# Patient Record
Sex: FEMALE | Race: ASIAN | Employment: OTHER | URBAN - METROPOLITAN AREA
[De-identification: names, ages, dates, MRNs, and addresses within clinical notes are randomized per-mention and may not be internally consistent; named-entity substitution may affect disease eponyms.]

---

## 2017-02-02 RX ORDER — METFORMIN HYDROCHLORIDE 500 MG/1
500 TABLET, EXTENDED RELEASE ORAL
Qty: 90 TAB | Refills: 0 | Status: SHIPPED | OUTPATIENT
Start: 2017-02-02 | End: 2017-04-28 | Stop reason: SDUPTHER

## 2017-04-28 ENCOUNTER — OFFICE VISIT (OUTPATIENT)
Dept: FAMILY MEDICINE CLINIC | Age: 62
End: 2017-04-28

## 2017-04-28 VITALS
OXYGEN SATURATION: 98 % | HEART RATE: 82 BPM | SYSTOLIC BLOOD PRESSURE: 164 MMHG | TEMPERATURE: 98.6 F | WEIGHT: 168.6 LBS | DIASTOLIC BLOOD PRESSURE: 104 MMHG | HEIGHT: 63 IN | BODY MASS INDEX: 29.88 KG/M2

## 2017-04-28 DIAGNOSIS — E55.9 VITAMIN D DEFICIENCY: ICD-10-CM

## 2017-04-28 DIAGNOSIS — E11.9 TYPE 2 DIABETES MELLITUS WITHOUT COMPLICATION, WITHOUT LONG-TERM CURRENT USE OF INSULIN (HCC): Primary | ICD-10-CM

## 2017-04-28 DIAGNOSIS — E78.00 PURE HYPERCHOLESTEROLEMIA: ICD-10-CM

## 2017-04-28 DIAGNOSIS — I10 ESSENTIAL HYPERTENSION: ICD-10-CM

## 2017-04-28 DIAGNOSIS — Z23 ENCOUNTER FOR IMMUNIZATION: ICD-10-CM

## 2017-04-28 LAB
A-G RATIO,AGRAT: 1.6 RATIO (ref 1.1–2.6)
ALBUMIN SERPL-MCNC: 4.5 G/DL (ref 3.5–5)
ALP SERPL-CCNC: 115 U/L (ref 40–120)
ALT SERPL-CCNC: 18 U/L (ref 5–40)
ANION GAP SERPL CALC-SCNC: 19 MMOL/L
AST SERPL W P-5'-P-CCNC: 18 U/L (ref 10–37)
BILIRUB SERPL-MCNC: 0.5 MG/DL (ref 0.2–1.2)
BUN SERPL-MCNC: 15 MG/DL (ref 6–22)
CALCIUM SERPL-MCNC: 9.5 MG/DL (ref 8.4–10.5)
CHLORIDE SERPL-SCNC: 100 MMOL/L (ref 98–110)
CHOLEST SERPL-MCNC: 174 MG/DL (ref 110–200)
CO2 SERPL-SCNC: 25 MMOL/L (ref 20–32)
CREAT SERPL-MCNC: 0.8 MG/DL (ref 0.8–1.4)
CREATININE, URINE: 258 MG/DL
ERYTHROCYTE [DISTWIDTH] IN BLOOD BY AUTOMATED COUNT: 14.3 % (ref 10–16)
GFRAA, 66117: >60
GFRNA, 66118: >60
GLOBULIN,GLOB: 2.9 G/DL (ref 2–4)
GLUCOSE SERPL-MCNC: 133 MG/DL (ref 65–99)
HBA1C MFR BLD HPLC: 6.8 %
HCT VFR BLD AUTO: 45.7 % (ref 35.1–48)
HDLC SERPL-MCNC: 53 MG/DL (ref 40–59)
HGB BLD-MCNC: 14.4 G/DL (ref 11.7–16)
LDLC SERPL CALC-MCNC: 103 MG/DL (ref 50–99)
MCH RBC QN AUTO: 28 PG (ref 26–34)
MCHC RBC AUTO-ENTMCNC: 32 G/DL (ref 32–36)
MCV RBC AUTO: 89 FL (ref 80–95)
MICROALB/CREAT RATIO, 140286: 5.9 MCG/MG OF CREATININE (ref 0–30)
MICROALBUMIN,URINE RANDOM 140054: 15.2 UG/ML (ref 0.1–17)
PLATELET # BLD AUTO: 282 K/UL (ref 140–440)
PMV BLD AUTO: 12.3 FL (ref 6–10.8)
POTASSIUM SERPL-SCNC: 4.4 MMOL/L (ref 3.5–5.5)
PROT SERPL-MCNC: 7.4 G/DL (ref 6.2–8.1)
RBC # BLD AUTO: 5.13 M/UL (ref 3.8–5.2)
SODIUM SERPL-SCNC: 144 MMOL/L (ref 133–145)
TRIGL SERPL-MCNC: 92 MG/DL (ref 40–149)
VLDLC SERPL CALC-MCNC: 18 MG/DL (ref 8–30)
WBC # BLD AUTO: 8.2 K/UL (ref 4–11)

## 2017-04-28 RX ORDER — ERGOCALCIFEROL 1.25 MG/1
50000 CAPSULE ORAL
Qty: 12 CAP | Refills: 3 | Status: SHIPPED | OUTPATIENT
Start: 2017-04-28 | End: 2018-12-03 | Stop reason: ALTCHOICE

## 2017-04-28 RX ORDER — TELMISARTAN AND HYDROCHLORTHIAZIDE 80; 25 MG/1; MG/1
1 TABLET ORAL DAILY
Qty: 90 TAB | Refills: 1 | Status: SHIPPED | OUTPATIENT
Start: 2017-04-28 | End: 2018-06-04 | Stop reason: SDUPTHER

## 2017-04-28 RX ORDER — METFORMIN HYDROCHLORIDE 500 MG/1
500 TABLET, EXTENDED RELEASE ORAL
Qty: 90 TAB | Refills: 1 | Status: SHIPPED | OUTPATIENT
Start: 2017-04-28 | End: 2017-12-04 | Stop reason: SDUPTHER

## 2017-04-28 RX ORDER — ATORVASTATIN CALCIUM 10 MG/1
10 TABLET, FILM COATED ORAL DAILY
Qty: 90 TAB | Refills: 1 | Status: SHIPPED | OUTPATIENT
Start: 2017-04-28 | End: 2018-06-04 | Stop reason: SDUPTHER

## 2017-04-28 NOTE — PATIENT INSTRUCTIONS

## 2017-04-28 NOTE — MR AVS SNAPSHOT
Visit Information Date & Time Provider Department Dept. Phone Encounter #  
 4/28/2017  8:15 AM Venice Miller, [facility] [phone] [id number] Follow-up Instructions Return in about 4 months (around 8/28/2017). Upcoming Health Maintenance Date Due MICROALBUMIN Q1 4/11/2017 EYE EXAM RETINAL OR DILATED Q1 6/10/2017 BREAST CANCER SCRN MAMMOGRAM 9/1/2017 LIPID PANEL Q1 9/26/2017 HEMOGLOBIN A1C Q6M 10/28/2017 FOOT EXAM Q1 4/28/2018 PAP AKA CERVICAL CYTOLOGY 4/11/2019 COLONOSCOPY 5/22/2024 DTaP/Tdap/Td series (2 - Td) 9/22/2025 Allergies as of 4/28/2017  Review Complete On: 9/26/2016 By: Venice Miller MD  
 No Known Allergies Current Immunizations  Reviewed on 9/22/2015 Name Date Pneumococcal Polysaccharide (PPSV-23)  Incomplete Tdap 9/22/2015  8:04 AM  
  
 Not reviewed this visit You Were Diagnosed With   
  
 Codes Comments Type 2 diabetes mellitus without complication, without long-term current use of insulin (HCC)    -  Primary ICD-10-CM: E11.9 ICD-9-CM: 250.00 Pure hypercholesterolemia     ICD-10-CM: E78.00 ICD-9-CM: 272.0 Essential hypertension     ICD-10-CM: I10 
ICD-9-CM: 401.9 Vitamin D deficiency     ICD-10-CM: E55.9 ICD-9-CM: 268.9 Encounter for immunization     ICD-10-CM: F04 ICD-9-CM: V03.89 Vitals BP Pulse Temp Height(growth percentile) Weight(growth percentile) SpO2  
 (!) 164/104 (BP 1 Location: Left arm, BP Patient Position: Sitting) 82 98.6 °F (37 °C) (Oral) 5' 3\" (1.6 m) 168 lb 9.6 oz (76.5 kg) 98% BMI OB Status Smoking Status 29.87 kg/m2 Menopause Never Smoker BMI and BSA Data Body Mass Index Body Surface Area  
 29.87 kg/m 2 1.84 m 2 Preferred Pharmacy Pharmacy Name Phone [id number] [address] [phone] Your Updated Medication List  
  
   
 This list is accurate as of: 4/28/17  8:36 AM.  Always use your most recent med list.  
  
  
  
  
 aspirin delayed-release 81 mg tablet Take 1 Tab by mouth daily. atorvastatin 10 mg tablet Commonly known as:  LIPITOR Take 1 Tab by mouth daily. Blood-Glucose Meter monitoring kit Test glucose daily  
  
 ergocalciferol 50,000 unit capsule Commonly known as:  VITAMIN D2 Take 1 Cap by mouth every seven (7) days. glucose blood VI test strips strip Commonly known as:  blood glucose test  
Test glucose daily Lancets Misc Test glucose daily  
  
 metFORMIN  mg tablet Commonly known as:  GLUCOPHAGE XR Take 1 Tab by mouth daily (with dinner). omeprazole 20 mg capsule Commonly known as:  PriLOSEC Take 1 Cap by mouth daily. SITagliptin 100 mg tablet Commonly known as:  Shannon Go Take 1 Tab by mouth daily. telmisartan-hydroCHLOROthiazide 80-25 mg per tablet Commonly known as:  MICARDIS HCT Take 1 Tab by mouth daily. Prescriptions Sent to Pharmacy Refills SITagliptin (JANUVIA) 100 mg tablet 0 Sig: Take 1 Tab by mouth daily. Class: Normal  
 [facility]: [facility] [address] [place] [address] Ph #: [phone] Route: Oral  
 metFORMIN ER (GLUCOPHAGE XR) 500 mg tablet 1 Sig: Take 1 Tab by mouth daily (with dinner). Class: Normal  
 [facility]: [facility] [address] [place] [address] Ph #: [phone] Route: Oral  
 ergocalciferol (VITAMIN D2) 50,000 unit capsule 3 Sig: Take 1 Cap by mouth every seven (7) days. Class: Normal  
 [facility]: [facility] [address] [place] [address] Ph #: [phone] Route: Oral  
 atorvastatin (LIPITOR) 10 mg tablet 1 Sig: Take 1 Tab by mouth daily.   
 Class: Normal  
 Pharmacy: [address] [place] [address] [address] [facility] Ph #: [phone] Route: Oral  
 telmisartan-hydroCHLOROthiazide (MICARDIS HCT) 80-25 mg per tablet 1 Sig: Take 1 Tab by mouth daily. Class: Normal  
 Pharmacy: [facility] Ph #: [phone] Route: Oral  
  
We Performed the Following AMB POC HEMOGLOBIN A1C [63651 CPT(R)] HM DIABETES FOOT EXAM [HM7 Custom] PNEUMOCOCCAL POLYSACCHARIDE VACCINE, 23-VALENT, ADULT OR IMMUNOSUPPRESSED PT DOSE, [31746 CPT(R)] [place] IMMUNIZ ADMIN,1 SINGLE/COMB VAC/TOXOID [id number] CPT(R)] Follow-up Instructions Return in about 4 months (around 8/28/2017). To-Do List   
 04/28/2017 Lab:  CBC W/O DIFF   
  
 04/28/2017 Lab:  LIPID PANEL   
  
 04/28/2017 Lab:  METABOLIC PANEL, COMPREHENSIVE   
  
 04/28/2017 Lab:  MICROALBUMIN, UR, RAND W/ MICROALBUMIN/CREA RATIO   
  
 06/01/2017 Outpatient Referral:  REFERRAL TO OPHTHALMOLOGY Referral Information Referral ID Referred By Referred To  
  
 [id number] Basilia Sarmiento V Not Available Visits Status Start Date End Date 1 New Request 4/28/17 4/28/18 If your referral has a status of pending review or denied, additional information will be sent to support the outcome of this decision. Patient Instructions Learning About Diabetes Food Guidelines Your Care Instructions Meal planning is important to manage diabetes. It helps keep your blood sugar at a target level (which you set with your doctor). You don't have to eat special foods. You can eat what your family eats, including sweets once in a while. But you do have to pay attention to how often you eat and how much you eat of certain foods. You may want to work with a dietitian or a certified diabetes educator (CDE) to help you plan meals and snacks. A dietitian or CDE can also help you lose weight if that is one of your goals. What should you know about eating carbs? Managing the amount of carbohydrate (carbs) you eat is an important part of healthy meals when you have diabetes. Carbohydrate is found in many foods. · Learn which foods have carbs. And learn the amounts of carbs in different foods. ¨ Bread, cereal, pasta, and rice have about 15 grams of carbs in a serving. A serving is 1 slice of bread (1 ounce), ½ cup of cooked cereal, or 1/3 cup of cooked pasta or rice. ¨ Fruits have 15 grams of carbs in a serving. A serving is 1 small fresh fruit, such as an apple or orange; ½ of a banana; ½ cup of cooked or canned fruit; ½ cup of fruit juice; 1 cup of melon or raspberries; or 2 tablespoons of dried fruit. ¨ Milk and no-sugar-added yogurt have 15 grams of carbs in a serving. A serving is 1 cup of milk or 2/3 cup of no-sugar-added yogurt. ¨ Starchy vegetables have 15 grams of carbs in a serving. A serving is ½ cup of mashed potatoes or sweet potato; 1 cup winter squash; ½ of a small baked potato; ½ cup of cooked beans; or ½ cup cooked corn or green peas. · Learn how much carbs to eat each day and at each meal. A dietitian or CDE can teach you how to keep track of the amount of carbs you eat. This is called carbohydrate counting. · If you are not sure how to count carbohydrate grams, use the Plate Method to plan meals. It is a good, quick way to make sure that you have a balanced meal. It also helps you spread carbs throughout the day. ¨ Divide your plate by types of foods. Put non-starchy vegetables on half the plate, meat or other protein food on one-quarter of the plate, and a grain or starchy vegetable in the final quarter of the plate.  To this you can add a small piece of fruit and 1 cup of milk or yogurt, depending on how many carbs you are supposed to eat at a meal. 
· Try to eat about the same amount of carbs at each meal. Do not \"save up\" your daily allowance of carbs to eat at one meal. 
 · Proteins have very little or no carbs per serving. Examples of proteins are beef, chicken, turkey, fish, eggs, tofu, cheese, cottage cheese, and peanut butter. A serving size of meat is 3 ounces, which is about the size of a deck of cards. Examples of meat substitute serving sizes (equal to 1 ounce of meat) are 1/4 cup of cottage cheese, 1 egg, 1 tablespoon of peanut butter, and ½ cup of tofu. How can you eat out and still eat healthy? · Learn to estimate the serving sizes of foods that have carbohydrate. If you measure food at home, it will be easier to estimate the amount in a serving of restaurant food. · If the meal you order has too much carbohydrate (such as potatoes, corn, or baked beans), ask to have a low-carbohydrate food instead. Ask for a salad or green vegetables. · If you use insulin, check your blood sugar before and after eating out to help you plan how much to eat in the future. · If you eat more carbohydrate at a meal than you had planned, take a walk or do other exercise. This will help lower your blood sugar. What else should you know? · Limit saturated fat, such as the fat from meat and dairy products. This is a healthy choice because people who have diabetes are at higher risk of heart disease. So choose lean cuts of meat and nonfat or low-fat dairy products. Use olive or canola oil instead of butter or shortening when cooking. · Don't skip meals. Your blood sugar may drop too low if you skip meals and take insulin or certain medicines for diabetes. · Check with your doctor before you drink alcohol. Alcohol can cause your blood sugar to drop too low. Alcohol can also cause a bad reaction if you take certain diabetes medicines. Follow-up care is a key part of your treatment and safety. Be sure to make and go to all appointments, and call your doctor if you are having problems. It's also a good idea to know your test results and keep a list of the medicines you take. Where can you learn more? Go to http://subha-elmer.info/. Enter N822 in the search box to learn more about \"Learning About Diabetes Food Guidelines. \" Current as of: May 23, 2016 Content Version: 11.2 © 3794-1273 [employer]. Care instructions adapted under license by [employer] (which disclaims liability or warranty for this information). If you have questions about a medical condition or this instruction, always ask your healthcare professional. [address] any warranty or liability for your use of this information. Introducing [facility]! Juan J Pierson introduces [employer] patient portal. Now you can access parts of your medical record, email your doctor's office, and request medication refills online. 1. In your internet browser, go to [email or web address] 2. Click on the First Time User? Click Here link in the Sign In box. You will see the New Member Sign Up page. 3. Enter your Transcriptic Access Code exactly as it appears below. You will not need to use this code after youve completed the sign-up process. If you do not sign up before the expiration date, you must request a new code. · Transcript[employer] Access Code: [email or web address] Expires: 7/27/2017  8:33 AM 
 
4. Enter the last four digits of your Social Security Number (xxxx) and Date of Birth (mm/dd/yyyy) as indicated and click Submit. You will be taken to the next sign-up page. 5. Create a Transcriptic ID. This will be your Transcriptic login ID and cannot be changed, so think of one that is secure and easy to remember. 6. Create a [employer] password. You can change your password at any time. 7. Enter your Password Reset Question and Answer. This can be used at a later time if you forget your password. 8. Enter your e-mail address. You will receive e-mail notification when new information is available in [address] 9. Click Sign Up. You can now view and download portions of your medical record. 10. Click the Download Summary menu link to download a portable copy of your medical information. If you have questions, please visit the Frequently Asked Questions section of the [employer] website. Remember, [employer] is NOT to be used for urgent needs. For medical emergencies, dial 911. Now available from your iPhone and Android! Please provide this summary of care documentation to your next provider. Your primary care clinician is listed as Lilibeth 13. If you have any questions after today's visit, please call [phone].

## 2017-04-28 NOTE — PROGRESS NOTES
Dom Moon is a 59 y.o. female  Chief Complaint   Patient presents with    Diabetes     3 months follow up     1. Have you been to the ER, urgent care clinic since your last visit? Hospitalized since your last visit? No    2. Have you seen or consulted any other health care providers outside of the [address] [place] [address] since your last visit? Include any pap smears or colon screening.  No    Pt is fasting today and didn't take any of her meds

## 2017-04-28 NOTE — PROGRESS NOTES
Assessment/Plan:    1. Type 2 diabetes mellitus without complication, without long-term current use of insulin (HCC)  -A1c 6.8. Cont current regimen. - AMB POC HEMOGLOBIN A1C  - MICROALBUMIN, UR, RAND W/ MICROALBUMIN/CREA RATIO; Future  - SITagliptin (JANUVIA) 100 mg tablet; Take 1 Tab by mouth daily. Dispense: 90 Tab; Refill: 0  - metFORMIN ER (GLUCOPHAGE XR) 500 mg tablet; Take 1 Tab by mouth daily (with dinner). Dispense: 90 Tab; Refill: 1  -  DIABETES FOOT EXAM  - REFERRAL TO OPHTHALMOLOGY; Future    2. Pure hypercholesterolemia  -cont statin  - METABOLIC PANEL, COMPREHENSIVE; Future  - LIPID PANEL; Future  - atorvastatin (LIPITOR) 10 mg tablet; Take 1 Tab by mouth daily. Dispense: 90 Tab; Refill: 1    3. Essential hypertension  -come in 1 week for bp ck as she didn't take her meds today  - METABOLIC PANEL, COMPREHENSIVE; Future  - LIPID PANEL; Future  - CBC W/O DIFF; Future  - telmisartan-hydroCHLOROthiazide (MICARDIS HCT) 80-25 mg per tablet; Take 1 Tab by mouth daily. Dispense: 90 Tab; Refill: 1    4. Vitamin D deficiency  -refilled  - ergocalciferol (VITAMIN D2) 50,000 unit capsule; Take 1 Cap by mouth every seven (7) days. Dispense: 12 Cap; Refill: 3    5. Encounter for immunization  - Pneumococcal polysaccharide vaccine, 23-valent, adult or immunosuppressed pt dose  - [place] IMMUNIZ ADMIN,1 SINGLE/COMB VAC/TOXOID    The plan was discussed with the patient. The patient verbalized understanding and is in agreement with the plan. All medication potential side effects were discussed with the patient.     Health Maintenance:   Health Maintenance   Topic Date Due    MICROALBUMIN Q1  04/11/2017    EYE EXAM RETINAL OR DILATED Q1  06/10/2017    BREAST CANCER SCRN MAMMOGRAM  09/01/2017    LIPID PANEL Q1  09/26/2017    HEMOGLOBIN A1C Q6M  10/28/2017    FOOT EXAM Q1  04/28/2018    PAP AKA CERVICAL CYTOLOGY  04/11/2019    COLONOSCOPY  05/22/2024    DTaP/Tdap/Td series (2 - Td) 09/22/2025    Hepatitis C Screening  Completed    ZOSTER VACCINE AGE 60>  Addressed    Pneumococcal 19-64 Medium Risk  Completed    INFLUENZA AGE 9 TO ADULT  Addressed       Saud Fink is a 59 y.o. female and presents with Diabetes (3 months follow up)     Subjective:  HTN - didn't take meds today. Elevated. DM2 - A1c is . Compliant with meds. On januvia and metformin. ROS:  Constitutional: No recent weight change. No weakness/fatigue. No f/c. Cardiovascular: No CP/palpitations. No MAGANA/orthopnea/PND. Respiratory: No cough/sputum, dyspnea, wheezing. Gastointestinal: No dysphagia, reflux. No n/v. No constipation/diarrhea. No melena/rectal bleeding. Neurological: No seizures/numbness/weakness. No paresthesias. Psychiatric:  No depression, anxiety. The problem list was updated as a part of today's visit. Patient Active Problem List   Diagnosis Code    Type 2 diabetes mellitus without complication (HCC) E73.0    Essential hypertension I10    H/O vitamin D deficiency Z86.39    Gastroesophageal reflux disease without esophagitis K21.9    Allergic rhinitis due to allergen J30.9    Macular degeneration, bilateral H35.30    Overweight (BMI 25.0-29. 9) E66.3    Pure hypercholesterolemia E78.00       The PSH, FH were reviewed. SH:  Social History   Substance Use Topics    Smoking status: Never Smoker    Smokeless tobacco: Never Used    Alcohol use No       Medications/Allergies:  Current Outpatient Prescriptions on File Prior to Visit   Medication Sig Dispense Refill    SITagliptin (JANUVIA) 100 mg tablet Take 1 Tab by mouth daily. 90 Tab 0    metFORMIN ER (GLUCOPHAGE XR) 500 mg tablet Take 1 Tab by mouth daily (with dinner). 90 Tab 0    ergocalciferol (VITAMIN D2) 50,000 unit capsule Take 1 Cap by mouth every seven (7) days. 12 Cap 0    atorvastatin (LIPITOR) 10 mg tablet Take 1 Tab by mouth daily.  90 Tab 1    telmisartan-hydrochlorothiazide (MICARDIS HCT) 80-25 mg per tablet Take 1 Tab by mouth daily. 90 Tab 1    omeprazole (PRILOSEC) 20 mg capsule Take 1 Cap by mouth daily. 90 Cap 3    aspirin delayed-release 81 mg tablet Take 1 Tab by mouth daily. 90 Tab 0    Blood-Glucose Meter monitoring kit Test glucose daily 1 Kit 0    glucose blood VI test strips (BLOOD GLUCOSE TEST) strip Test glucose daily 100 Strip 11    Lancets misc Test glucose daily 1 Each 11     No current facility-administered medications on file prior to visit. No Known Allergies    Objective:  Visit Vitals    BP (!) 164/104 (BP 1 Location: Left arm, BP Patient Position: Sitting)    Pulse 82    Temp 98.6 °F (37 °C) (Oral)    Ht 5' 3\" (1.6 m)    Wt 168 lb 9.6 oz (76.5 kg)    SpO2 98%    BMI 29.87 kg/m2      Constitutional: Well developed, nourished, no distress, alert, obese habitus   CV: S1, S2.  RRR. No murmurs/rubs. No thrills palpated. No carotid bruits. Intact distal pulses. No edema. Pulm: No abnormalities on inspection. Clear to auscultation bilaterally. No wheezing/rhonchi. Normal effort. Psych: Appropriate affect, judgement and insight. Short-term memory intact.      Diabetic foot exam:     Left:    Filament test normal sensation with micro filament

## 2017-06-19 DIAGNOSIS — Z12.39 SCREENING FOR BREAST CANCER: Primary | ICD-10-CM

## 2017-09-18 DIAGNOSIS — E11.9 TYPE 2 DIABETES MELLITUS WITHOUT COMPLICATION, WITHOUT LONG-TERM CURRENT USE OF INSULIN (HCC): ICD-10-CM

## 2017-09-18 RX ORDER — SITAGLIPTIN 100 MG/1
TABLET, FILM COATED ORAL
Qty: 90 TAB | Refills: 0 | Status: SHIPPED | OUTPATIENT
Start: 2017-09-18 | End: 2017-12-21 | Stop reason: SDUPTHER

## 2017-10-02 DIAGNOSIS — E11.9 TYPE 2 DIABETES MELLITUS WITHOUT COMPLICATION, WITHOUT LONG-TERM CURRENT USE OF INSULIN (HCC): ICD-10-CM

## 2017-12-04 ENCOUNTER — OFFICE VISIT (OUTPATIENT)
Dept: FAMILY MEDICINE CLINIC | Age: 62
End: 2017-12-04

## 2017-12-04 VITALS
DIASTOLIC BLOOD PRESSURE: 86 MMHG | HEIGHT: 63 IN | BODY MASS INDEX: 30.12 KG/M2 | RESPIRATION RATE: 20 BRPM | SYSTOLIC BLOOD PRESSURE: 140 MMHG | TEMPERATURE: 98.2 F | HEART RATE: 100 BPM | WEIGHT: 170 LBS | OXYGEN SATURATION: 98 %

## 2017-12-04 DIAGNOSIS — Z12.39 SCREENING FOR BREAST CANCER: ICD-10-CM

## 2017-12-04 DIAGNOSIS — L50.9 HIVES: ICD-10-CM

## 2017-12-04 DIAGNOSIS — I10 ESSENTIAL HYPERTENSION: ICD-10-CM

## 2017-12-04 DIAGNOSIS — E11.9 TYPE 2 DIABETES MELLITUS WITHOUT COMPLICATION, WITHOUT LONG-TERM CURRENT USE OF INSULIN (HCC): Primary | ICD-10-CM

## 2017-12-04 LAB — HBA1C MFR BLD HPLC: 8 %

## 2017-12-04 RX ORDER — METFORMIN HYDROCHLORIDE 500 MG/1
500 TABLET, EXTENDED RELEASE ORAL 2 TIMES DAILY
Qty: 180 TAB | Refills: 1 | Status: SHIPPED | OUTPATIENT
Start: 2017-12-04 | End: 2018-06-04 | Stop reason: SDUPTHER

## 2017-12-04 RX ORDER — AMLODIPINE BESYLATE 2.5 MG/1
2.5 TABLET ORAL DAILY
Qty: 90 TAB | Refills: 0 | Status: SHIPPED | OUTPATIENT
Start: 2017-12-04 | End: 2018-03-05 | Stop reason: SDUPTHER

## 2017-12-04 NOTE — PROGRESS NOTES
Rhetta Fothergill is a 57 y.o. female (: 1955) presenting to address:    Chief Complaint   Patient presents with    Hypertension    Diabetes    Cholesterol Problem    Rash     Allergic reaction       Vitals:    17 0751   BP: 140/86   Pulse: 100   Resp: 20   Temp: 98.2 °F (36.8 °C)   TempSrc: Oral   SpO2: 98%   Weight: 170 lb (77.1 kg)   Height: 5' 3\" (1.6 m)   PainSc:   0 - No pain       Learning Assessment:     Learning Assessment 2016   PRIMARY LEARNER Patient   HIGHEST LEVEL OF EDUCATION - PRIMARY LEARNER  4 YEARS OF COLLEGE   BARRIERS PRIMARY LEARNER NONE   CO-LEARNER CAREGIVER No   PRIMARY LANGUAGE ENGLISH   LEARNER PREFERENCE PRIMARY LISTENING   ANSWERED BY self   RELATIONSHIP SELF     Depression Screening:     PHQ over the last two weeks 2017   Little interest or pleasure in doing things Not at all   Feeling down, depressed or hopeless Not at all   Total Score PHQ 2 0     Fall Risk Assessment:     Fall Risk Assessment, last 12 mths 2017   Able to walk? Yes   Fall in past 12 months? No     Abuse Screening:     Abuse Screening Questionnaire 2017   Do you ever feel afraid of your partner? N   Are you in a relationship with someone who physically or mentally threatens you? N   Is it safe for you to go home? Y     Coordination of Care Questionaire:   1. Have you been to the ER, urgent care clinic since your last visit? Hospitalized since your last visit? NO    2. Have you seen or consulted any other health care providers outside of the [address] [place] [address] since your last visit? Include any pap smears or colon screening. Yes. Ophthalmology 2017    Advanced Directive:   1. Do you have an Advanced Directive? NO    2. Would you like information on Advanced Directives?  YES

## 2017-12-04 NOTE — MR AVS SNAPSHOT
Visit Information Date & Time Provider Department Dept. Phone Encounter #  
 12/4/2017  8:00 AM Nickiemaynor ChavezalexandreaGer [facility] [place] [phone] [id number] Follow-up Instructions Return in about 3 months (around 3/4/2018) for DM, BP. Upcoming Health Maintenance Date Due  
 BREAST CANCER SCRN MAMMOGRAM 9/1/2017 FOOT EXAM Q1 4/28/2018 MICROALBUMIN Q1 4/28/2018 LIPID PANEL Q1 4/28/2018 HEMOGLOBIN A1C Q6M 6/4/2018 EYE EXAM RETINAL OR DILATED Q1 9/7/2018 PAP AKA CERVICAL CYTOLOGY 4/11/2019 COLONOSCOPY 5/22/2024 DTaP/Tdap/Td series (2 - Td) 9/22/2025 Allergies as of 12/4/2017  Review Complete On: 12/4/2017 By: Nickie Plasencia MD  
 No Known Allergies Current Immunizations  Reviewed on 9/22/2015 Name Date Pneumococcal Polysaccharide (PPSV-23) 4/28/2017  8:41 AM  
 Tdap 9/22/2015  8:04 AM  
  
 Not reviewed this visit You Were Diagnosed With   
  
 Codes Comments Type 2 diabetes mellitus without complication, without long-term current use of insulin (HCC)    -  Primary ICD-10-CM: E11.9 ICD-9-CM: 250.00 Essential hypertension     ICD-10-CM: I10 
ICD-9-CM: 401.9 Hives     ICD-10-CM: L50.9 ICD-9-CM: 708.9 Screening for breast cancer     ICD-10-CM: Z12.31 
ICD-9-CM: V76.10 Vitals BP Pulse Temp Resp Height(growth percentile) Weight(growth percentile) 140/86 (BP 1 Location: Left arm, BP Patient Position: Sitting) 100 98.2 °F (36.8 °C) (Oral) 20 5' 3\" (1.6 m) 170 lb (77.1 kg) SpO2 BMI OB Status Smoking Status 98% 30.11 kg/m2 Menopause Never Smoker Vitals History BMI and BSA Data Body Mass Index Body Surface Area  
 30.11 kg/m 2 1.85 m 2 Preferred Pharmacy Pharmacy Name Phone [id number] [address] [address] [phone] Your Updated Medication List  
  
   
 This list is accurate as of: 12/4/17  8:18 AM.  Always use your most recent med list. amLODIPine 2.5 mg tablet Commonly known as:  Castro Cater Take 1 Tab by mouth daily. Indications: hypertension  
  
 aspirin delayed-release 81 mg tablet Take 1 Tab by mouth daily. atorvastatin 10 mg tablet Commonly known as:  LIPITOR Take 1 Tab by mouth daily. Blood-Glucose Meter monitoring kit Test glucose daily  
  
 ergocalciferol 50,000 unit capsule Commonly known as:  VITAMIN D2 Take 1 Cap by mouth every seven (7) days. glucose blood VI test strips strip Commonly known as:  blood glucose test  
Test glucose daily JANUVIA 100 mg tablet Generic drug:  SITagliptin  
take 1 tablet by mouth once daily Lancets Misc Test glucose daily  
  
 metFORMIN  mg tablet Commonly known as:  GLUCOPHAGE XR Take 1 Tab by mouth two (2) times a day. telmisartan-hydroCHLOROthiazide 80-25 mg per tablet Commonly known as:  MICARDIS HCT Take 1 Tab by mouth daily. Prescriptions Sent to Pharmacy Refills  
 amLODIPine (NORVASC) 2.5 mg tablet 0 Sig: Take 1 Tab by mouth daily. Indications: hypertension Class: Normal  
 [facility] [facility] [place] [address] Ph #: [phone] Route: Oral  
 metFORMIN ER (GLUCOPHAGE XR) 500 mg tablet 1 Sig: Take 1 Tab by mouth two (2) times a day. Class: Normal  
 [facility] [address] #: [phone] Route: Oral  
  
We Performed the Following AMB POC HEMOGLOBIN A1C [29624 CPT(R)] Follow-up Instructions Return in about 3 months (around 3/4/2018) for DM, BP. To-Do List   
 12/04/2017 Imaging:  [facility] MAMMO BI SCREENING INCL CAD Patient Instructions Amlodipine (By mouth) Amlodipine (pf-NWV-pm-peen) Treats high blood pressure and angina (chest pain). This medicine is a calcium channel blocker. Brand Name(s): Norvasc There may be other brand names for this medicine. When This Medicine Should Not Be Used: This medicine is not right for everyone. Do not use it if you had an allergic reaction to amlodipine. How to Use This Medicine:  
Tablet, Dissolving Tablet · Take your medicine as directed. Your dose may need to be changed several times to find what works best for you. Take this medicine at the same time each day. · Read and follow the patient instructions that come with this medicine. Talk to your doctor or pharmacist if you have any questions. · Missed dose: Take a dose as soon as you remember. If it has been more than 12 hours since you were supposed to take your dose, skip the missed dose and take your next regular dose at the regular time. · Store the medicine in a closed container at room temperature, away from heat, moisture, and direct light. Drugs and Foods to Avoid: Ask your doctor or pharmacist before using any other medicine, including over-the-counter medicines, vitamins, and herbal products. · Some medicines can affect how amlodipine works. Tell your doctor if you are also using any of the following: ¨ Clarithromycin, cyclosporine, diltiazem, itraconazole, ritonavir, sildenafil, simvastatin, tacrolimus Warnings While Using This Medicine: · Tell your doctor if you are pregnant or breastfeeding, or if you have liver disease, heart disease, coronary artery disease, or aortic stenosis. · This medicine could lower your blood pressure too much, especially when you first use it or if you are dehydrated. Stand or sit up slowly if you feel lightheaded or dizzy. · Your doctor will check your progress and the effects of this medicine at regular visits. Keep all appointments.  
· Do not stop using this medicine without asking your doctor, even if you feel well. This medicine will not cure high blood pressure, but it will help keep it in normal range. You may have to take blood pressure medicine for the rest of your life. · Keep all medicine out of the reach of children. Never share your medicine with anyone. Possible Side Effects While Using This Medicine:  
Call your doctor right away if you notice any of these side effects: · Allergic reaction: Itching or hives, swelling in your face or hands, swelling or tingling in your mouth or throat, chest tightness, trouble breathing · Lightheadedness, dizziness · New or worsening chest pain · Swelling in your hands, ankles, or legs · Trouble breathing, nausea, unusual sweating, fainting If you notice other side effects that you think are caused by this medicine, tell your doctor. Call your doctor for medical advice about side effects. You may report side effects to FDA at 5-044-FDA-5102 © 2017 [address] Information is for End User's use only and may not be sold, redistributed or otherwise used for commercial purposes. The above information is an  only. It is not intended as medical advice for individual conditions or treatments. Talk to your doctor, nurse or pharmacist before following any medical regimen to see if it is safe and effective for you. Introducing [facility]! Tarik Gonzales introduces [employer] patient portal. Now you can access parts of your medical record, email your doctor's office, and request medication refills online. 1. In your internet browser, go to [email or web address] 2. Click on the First Time User? Click Here link in the Sign In box. You will see the New Member Sign Up page. 3. Enter your [employer] Access Code exactly as it appears below. You will not need to use this code after youve completed the sign-up process. If you do not sign up before the expiration date, you must request a new code. · [employer] Access Code: [email or web address] Expires: 3/4/2018  8:18 AM 
 
4. Enter the last four digits of your Social Security Number (xxxx) and Date of Birth (mm/dd/yyyy) as indicated and click Submit. You will be taken to the next sign-up page. 5. Create a [employer] ID. This will be your [employer] login ID and cannot be changed, so think of one that is secure and easy to remember. 6. Create a [employer] password. You can change your password at any time. 7. Enter your Password Reset Question and Answer. This can be used at a later time if you forget your password. 8. Enter your e-mail address. You will receive e-mail notification when new information is available in [address] 9. Click Sign Up. You can now view and download portions of your medical record. 10. Click the Download Summary menu link to download a portable copy of your medical information. If you have questions, please visit the Frequently Asked Questions section of the [employer] website. Remember, [employer] is NOT to be used for urgent needs. For medical emergencies, dial 911. Now available from your iPhone and Android! Please provide this summary of care documentation to your next provider. Your primary care clinician is listed as Lilibeth 13. If you have any questions after today's visit, please call [phone].

## 2017-12-04 NOTE — PATIENT INSTRUCTIONS
Amlodipine (By mouth)   Amlodipine (av-ALN-nz-peen)  Treats high blood pressure and angina (chest pain). This medicine is a calcium channel blocker. Brand Name(s): Norvasc   There may be other brand names for this medicine. When This Medicine Should Not Be Used: This medicine is not right for everyone. Do not use it if you had an allergic reaction to amlodipine. How to Use This Medicine:   Tablet, Dissolving Tablet  · Take your medicine as directed. Your dose may need to be changed several times to find what works best for you. Take this medicine at the same time each day. · Read and follow the patient instructions that come with this medicine. Talk to your doctor or pharmacist if you have any questions. · Missed dose: Take a dose as soon as you remember. If it has been more than 12 hours since you were supposed to take your dose, skip the missed dose and take your next regular dose at the regular time. · Store the medicine in a closed container at room temperature, away from heat, moisture, and direct light. Drugs and Foods to Avoid:   Ask your doctor or pharmacist before using any other medicine, including over-the-counter medicines, vitamins, and herbal products. · Some medicines can affect how amlodipine works. Tell your doctor if you are also using any of the following:   ¨ Clarithromycin, cyclosporine, diltiazem, itraconazole, ritonavir, sildenafil, simvastatin, tacrolimus  Warnings While Using This Medicine:   · Tell your doctor if you are pregnant or breastfeeding, or if you have liver disease, heart disease, coronary artery disease, or aortic stenosis. · This medicine could lower your blood pressure too much, especially when you first use it or if you are dehydrated. Stand or sit up slowly if you feel lightheaded or dizzy. · Your doctor will check your progress and the effects of this medicine at regular visits. Keep all appointments.   · Do not stop using this medicine without asking your doctor, even if you feel well. This medicine will not cure high blood pressure, but it will help keep it in normal range. You may have to take blood pressure medicine for the rest of your life. · Keep all medicine out of the reach of children. Never share your medicine with anyone. Possible Side Effects While Using This Medicine:   Call your doctor right away if you notice any of these side effects:  · Allergic reaction: Itching or hives, swelling in your face or hands, swelling or tingling in your mouth or throat, chest tightness, trouble breathing  · Lightheadedness, dizziness  · New or worsening chest pain  · Swelling in your hands, ankles, or legs  · Trouble breathing, nausea, unusual sweating, fainting  If you notice other side effects that you think are caused by this medicine, tell your doctor. Call your doctor for medical advice about side effects. You may report side effects to FDA at 0-277-FDA-6863  © 2017 Formerly named [facility] Information is for End User's use only and may not be sold, redistributed or otherwise used for commercial purposes. The above information is an  only. It is not intended as medical advice for individual conditions or treatments. Talk to your doctor, nurse or pharmacist before following any medical regimen to see if it is safe and effective for you.

## 2017-12-04 NOTE — PROGRESS NOTES
Assessment/Plan:    1. Type 2 diabetes mellitus without complication, without long-term current use of insulin (HCC)  -A1c 8.0.  incr metformin to bid. F/u in 3 mos. - AMB POC HEMOGLOBIN A1C  - metFORMIN ER (GLUCOPHAGE XR) 500 mg tablet; Take 1 Tab by mouth two (2) times a day. Dispense: 180 Tab; Refill: 1    2. Essential hypertension  -add norvasc.  - amLODIPine (NORVASC) 2.5 mg tablet; Take 1 Tab by mouth daily. Indications: hypertension  Dispense: 90 Tab; Refill: 0    3. Hives  -otc antihistamine    4. Screening for breast cancer  - [facility] MAMMO BI SCREENING INCL CAD; Future      The plan was discussed with the patient. The patient verbalized understanding and is in agreement with the plan. All medication potential side effects were discussed with the patient. Health Maintenance:   Health Maintenance   Topic Date Due    BREAST CANCER SCRN MAMMOGRAM  09/01/2017    FOOT EXAM Q1  04/28/2018    MICROALBUMIN Q1  04/28/2018    LIPID PANEL Q1  04/28/2018    HEMOGLOBIN A1C Q6M  06/04/2018    EYE EXAM RETINAL OR DILATED Q1  09/07/2018    PAP AKA CERVICAL CYTOLOGY  04/11/2019    COLONOSCOPY  05/22/2024    DTaP/Tdap/Td series (2 - Td) 09/22/2025    Hepatitis C Screening  Completed    ZOSTER VACCINE AGE 60>  Addressed    Pneumococcal 19-64 Medium Risk  Completed    Influenza Age 5 to Adult  Addressed       Fernanda Agustin is a 57 y.o. female and presents with Hypertension; Diabetes; Cholesterol Problem; and Rash (Allergic reaction)     Subjective:  DM2- A1c is 8.0. On metformin and januvia. She isn't compliant with a diabetic diet. HTN - bp elevated. Hasn't been seen for 8 mos. Pt notes itchy rash on different parts of her body. Lotion makes it resolve. Rash is present <1 day. Worse after a shower. ROS:  Constitutional: No recent weight change. No weakness/fatigue. No f/c. Skin: No rashes, change in nails/hair, itching   Cardiovascular: No CP/palpitations. No MAGANA/orthopnea/PND. Respiratory: No cough/sputum, dyspnea, wheezing. Endo: No heat/cold intolerance, no polyuria/polydypsia. Neurological: No seizures/numbness/weakness. No paresthesias. Psychiatric:  No depression, anxiety. The problem list was updated as a part of today's visit. Patient Active Problem List   Diagnosis Code    Type 2 diabetes mellitus without complication ([facility]) Z56.6    Essential hypertension I10    H/O vitamin D deficiency Z86.39    Gastroesophageal reflux disease without esophagitis K21.9    Allergic rhinitis due to allergen J30.9    Macular degeneration, bilateral H35.30    Overweight (BMI 25.0-29. 9) E66.3    Pure hypercholesterolemia E78.00       The PSH, FH were reviewed. SH:  Social History   Substance Use Topics    Smoking status: Never Smoker    Smokeless tobacco: Never Used    Alcohol use No       Medications/Allergies:  Current Outpatient Prescriptions on File Prior to Visit   Medication Sig Dispense Refill    JANUVIA 100 mg tablet take 1 tablet by mouth once daily 90 Tab 0    metFORMIN ER (GLUCOPHAGE XR) 500 mg tablet Take 1 Tab by mouth daily (with dinner). 90 Tab 1    ergocalciferol (VITAMIN D2) 50,000 unit capsule Take 1 Cap by mouth every seven (7) days. 12 Cap 3    atorvastatin (LIPITOR) 10 mg tablet Take 1 Tab by mouth daily. 90 Tab 1    telmisartan-hydroCHLOROthiazide (MICARDIS HCT) 80-25 mg per tablet Take 1 Tab by mouth daily. 90 Tab 1    aspirin delayed-release 81 mg tablet Take 1 Tab by mouth daily. 90 Tab 0    Blood-Glucose Meter monitoring kit Test glucose daily 1 Kit 0    glucose blood VI test strips (BLOOD GLUCOSE TEST) strip Test glucose daily 100 Strip 11    Lancets misc Test glucose daily 1 Each 11    omeprazole (PRILOSEC) 20 mg capsule Take 1 Cap by mouth daily. 90 Cap 3     No current facility-administered medications on file prior to visit.          No Known Allergies    Objective:  Visit Vitals    /86 (BP 1 Location: Left arm, BP Patient Position: Sitting)    Pulse 100    Temp 98.2 °F (36.8 °C) (Oral)    Resp 20    Ht 5' 3\" (1.6 m)    Wt 170 lb (77.1 kg)    SpO2 98%    BMI 30.11 kg/m2      Constitutional: Well developed, nourished, no distress, alert, obese habitus   CV: S1, S2.  RRR. No murmurs/rubs. No thrills palpated. No carotid bruits. Intact distal pulses. No edema. Pulm: No abnormalities on inspection. Clear to auscultation bilaterally. No wheezing/rhonchi. Normal effort. GI: Soft, nontender, nondistended. Normal active bowel sounds. Neuro: A/O x 3. No focal motor or sensory deficits. Speech normal.   Psych: Appropriate affect, judgement and insight. Short-term memory intact. Labwork and Ancillary Studies:    CBC w/Diff  Lab Results   Component Value Date/Time    WBC 8.2 04/28/2017 08:44 AM    HGB 14.4 04/28/2017 08:44 AM    PLATELET 728 96/46/7204 08:44 AM         Basic Metabolic Profile/LFTs  Lab Results   Component Value Date/Time    Sodium 144 04/28/2017 08:44 AM    Potassium 4.4 04/28/2017 08:44 AM    Chloride 100 04/28/2017 08:44 AM    CO2 25 04/28/2017 08:44 AM    Anion gap 19.0 04/28/2017 08:44 AM    Glucose 133 04/28/2017 08:44 AM    BUN 15 04/28/2017 08:44 AM    Creatinine 0.8 04/28/2017 08:44 AM    BUN/Creatinine ratio 19 11/03/2010 07:43 AM    GFR est AA >60 11/03/2010 07:43 AM    GFR est non-AA >60 11/03/2010 07:43 AM    Calcium 9.5 04/28/2017 08:44 AM      Lab Results   Component Value Date/Time    ALT (SGPT) 18 04/28/2017 08:44 AM    AST (SGOT) 18 04/28/2017 08:44 AM    Alk.  phosphatase 115 04/28/2017 08:44 AM    Bilirubin, total 0.5 04/28/2017 08:44 AM       Cholesterol  Lab Results   Component Value Date/Time    Cholesterol, total 174 04/28/2017 08:44 AM    HDL Cholesterol 53 04/28/2017 08:44 AM    LDL, calculated 103 04/28/2017 08:44 AM    Triglyceride 92 04/28/2017 08:44 AM    CHOL/HDL Ratio 5.1 11/03/2010 07:43 AM

## 2017-12-21 DIAGNOSIS — E11.9 TYPE 2 DIABETES MELLITUS WITHOUT COMPLICATION, WITHOUT LONG-TERM CURRENT USE OF INSULIN (HCC): ICD-10-CM

## 2017-12-21 RX ORDER — SITAGLIPTIN 100 MG/1
TABLET, FILM COATED ORAL
Qty: 90 TAB | Refills: 0 | Status: SHIPPED | OUTPATIENT
Start: 2017-12-21 | End: 2018-06-04 | Stop reason: ALTCHOICE

## 2018-01-17 ENCOUNTER — HOSPITAL ENCOUNTER (OUTPATIENT)
Dept: MAMMOGRAPHY | Age: 63
Discharge: HOME | End: 2018-01-17
Attending: INTERNAL MEDICINE
Payer: COMMERCIAL

## 2018-01-17 DIAGNOSIS — Z12.39 SCREENING FOR BREAST CANCER: ICD-10-CM

## 2018-01-17 PROCEDURE — 77063 BREAST TOMOSYNTHESIS BI: CPT

## 2018-03-05 ENCOUNTER — OFFICE VISIT (OUTPATIENT)
Dept: FAMILY MEDICINE CLINIC | Age: 63
End: 2018-03-05

## 2018-03-05 VITALS
TEMPERATURE: 98.4 F | SYSTOLIC BLOOD PRESSURE: 140 MMHG | OXYGEN SATURATION: 99 % | WEIGHT: 170 LBS | HEART RATE: 109 BPM | RESPIRATION RATE: 20 BRPM | DIASTOLIC BLOOD PRESSURE: 90 MMHG | HEIGHT: 63 IN | BODY MASS INDEX: 30.12 KG/M2

## 2018-03-05 DIAGNOSIS — I10 ESSENTIAL HYPERTENSION: ICD-10-CM

## 2018-03-05 DIAGNOSIS — E11.9 TYPE 2 DIABETES MELLITUS WITHOUT COMPLICATION, WITHOUT LONG-TERM CURRENT USE OF INSULIN (HCC): Primary | ICD-10-CM

## 2018-03-05 DIAGNOSIS — E66.09 CLASS 1 OBESITY DUE TO EXCESS CALORIES WITHOUT SERIOUS COMORBIDITY WITH BODY MASS INDEX (BMI) OF 30.0 TO 30.9 IN ADULT: ICD-10-CM

## 2018-03-05 LAB — HBA1C MFR BLD HPLC: 6.8 %

## 2018-03-05 RX ORDER — AMLODIPINE BESYLATE 5 MG/1
5 TABLET ORAL DAILY
Qty: 90 TAB | Refills: 0 | Status: SHIPPED | OUTPATIENT
Start: 2018-03-05 | End: 2018-06-04 | Stop reason: SDUPTHER

## 2018-03-05 NOTE — PROGRESS NOTES
Humble Alexandra is a 57 y.o. female (: 1955) presenting to address:    Chief Complaint   Patient presents with    Hypertension    Diabetes       Vitals:    18 0738 18 0743   BP: (!) 138/92 140/90   Pulse: (!) 109    Resp: 20    Temp: 98.4 °F (36.9 °C)    TempSrc: Oral    SpO2: 99%    Weight: 170 lb (77.1 kg)    Height: 5' 3\" (1.6 m)    PainSc:   0 - No pain        Learning Assessment:     Learning Assessment 2016   PRIMARY LEARNER Patient   HIGHEST LEVEL OF EDUCATION - PRIMARY LEARNER  4 YEARS OF COLLEGE   BARRIERS PRIMARY LEARNER NONE   CO-LEARNER CAREGIVER No   PRIMARY LANGUAGE ENGLISH   LEARNER PREFERENCE PRIMARY LISTENING   ANSWERED BY self   RELATIONSHIP SELF     Depression Screening:     PHQ over the last two weeks 3/5/2018   Little interest or pleasure in doing things Not at all   Feeling down, depressed or hopeless Not at all   Total Score PHQ 2 0     Fall Risk Assessment:     Fall Risk Assessment, last 12 mths 3/5/2018   Able to walk? Yes   Fall in past 12 months? No     Abuse Screening:     Abuse Screening Questionnaire 3/5/2018   Do you ever feel afraid of your partner? N   Are you in a relationship with someone who physically or mentally threatens you? N   Is it safe for you to go home? Y     Coordination of Care Questionaire:   1. Have you been to the ER, urgent care clinic since your last visit? Hospitalized since your last visit? NO    2. Have you seen or consulted any other health care providers outside of the [address] [place] [address] since your last visit? Include any pap smears or colon screening. NO    Advanced Directive:   1. Do you have an Advanced Directive? NO    2. Would you like information on Advanced Directives?  YES

## 2018-03-05 NOTE — PATIENT INSTRUCTIONS

## 2018-03-05 NOTE — PROGRESS NOTES
Assessment/Plan:    1. Type 2 diabetes mellitus without complication, without long-term current use of insulin (HCC)  -A1c 6.8. Cont current regimen. - AMB POC HEMOGLOBIN A1C    2. Essential hypertension  -increase to 5mg. F/u in 3mos  - amLODIPine (NORVASC) 5 mg tablet; Take 1 Tab by mouth daily. Indications: hypertension  Dispense: 90 Tab; Refill: 0    3. Class 1 obesity due to excess calories without serious comorbidity with body mass index (BMI) of 30.0 to 30.9 in adult  -work on exercise and wt loss      The plan was discussed with the patient. The patient verbalized understanding and is in agreement with the plan. All medication potential side effects were discussed with the patient. Health Maintenance:   Health Maintenance   Topic Date Due    FOOT EXAM Q1  04/28/2018    MICROALBUMIN Q1  04/28/2018    LIPID PANEL Q1  04/28/2018    HEMOGLOBIN A1C Q6M  06/04/2018    EYE EXAM RETINAL OR DILATED Q1  09/07/2018    PAP AKA CERVICAL CYTOLOGY  04/11/2019    BREAST CANCER SCRN MAMMOGRAM  01/17/2020    COLONOSCOPY  05/22/2024    DTaP/Tdap/Td series (2 - Td) 09/22/2025    Hepatitis C Screening  Completed    ZOSTER VACCINE AGE 60>  Addressed    Pneumococcal 19-64 Medium Risk  Completed    Influenza Age 5 to Adult  Addressed     Darren Vicente is a 57 y.o. female and presents with Hypertension and Diabetes     Subjective:  DM2 - metformin was increased last visit. Is watching her diet. A1c improved to 6.8. HTN - norvasc added last visit. bp remains elevated. ROS:  Constitutional: No recent weight change. No weakness/fatigue. No f/c. Skin: No rashes, change in nails/hair, itching   HENT: No HA, dizziness. No hearing loss/tinnitus. No nasal congestion/discharge. Eyes: No change in vision, double/blurred vision or eye pain/redness. Cardiovascular: No CP/palpitations. No MAGANA/orthopnea/PND. Respiratory: No cough/sputum, dyspnea, wheezing. Gastointestinal: No dysphagia, reflux.   No n/v.  No constipation/diarrhea. No melena/rectal bleeding. The problem list was updated as a part of today's visit. Patient Active Problem List   Diagnosis Code    Type 2 diabetes mellitus without complication (HCC) E32.6    Essential hypertension I10    H/O vitamin D deficiency Z86.39    Gastroesophageal reflux disease without esophagitis K21.9    Allergic rhinitis due to allergen J30.9    Macular degeneration, bilateral H35.30    Overweight (BMI 25.0-29. 9) E66.3    Pure hypercholesterolemia E78.00    Hives L50.9       The PSH, FH were reviewed. SH:  Social History   Substance Use Topics    Smoking status: Never Smoker    Smokeless tobacco: Never Used    Alcohol use No       Medications/Allergies:  Current Outpatient Prescriptions on File Prior to Visit   Medication Sig Dispense Refill    JANUVIA 100 mg tablet take 1 tablet by mouth once daily 90 Tab 0    amLODIPine (NORVASC) 2.5 mg tablet Take 1 Tab by mouth daily. Indications: hypertension 90 Tab 0    metFORMIN ER (GLUCOPHAGE XR) 500 mg tablet Take 1 Tab by mouth two (2) times a day. 180 Tab 1    ergocalciferol (VITAMIN D2) 50,000 unit capsule Take 1 Cap by mouth every seven (7) days. 12 Cap 3    atorvastatin (LIPITOR) 10 mg tablet Take 1 Tab by mouth daily. 90 Tab 1    telmisartan-hydroCHLOROthiazide (MICARDIS HCT) 80-25 mg per tablet Take 1 Tab by mouth daily. 90 Tab 1    aspirin delayed-release 81 mg tablet Take 1 Tab by mouth daily. 90 Tab 0    Blood-Glucose Meter monitoring kit Test glucose daily 1 Kit 0    glucose blood VI test strips (BLOOD GLUCOSE TEST) strip Test glucose daily 100 Strip 11    Lancets misc Test glucose daily 1 Each 11     No current facility-administered medications on file prior to visit.          No Known Allergies    Objective:  Visit Vitals    /90 (BP 1 Location: Left arm, BP Patient Position: Sitting)    Pulse (!) 109    Temp 98.4 °F (36.9 °C) (Oral)    Resp 20    Ht 5' 3\" (1.6 m)    Wt 170 lb (77.1 kg)    SpO2 99%    BMI 30.11 kg/m2      Constitutional: Well developed, nourished, no distress, alert, obese habitus   CV: S1, S2.  RRR. No murmurs/rubs. No thrills palpated. No carotid bruits. Intact distal pulses. No edema. Pulm: No abnormalities on inspection. Clear to auscultation bilaterally. No wheezing/rhonchi. Normal effort. GI: Soft, nontender, nondistended. Normal active bowel sounds. MS: Gait normal.  Joints without deformity/tenderness. Strength intact bilateral upper and lower ext. Normal ROM all extremities. Neuro: A/O x 3. No focal motor or sensory deficits. Speech normal.   Skin: No lesions/rashes on inspection. Psych: Appropriate affect, judgement and insight. Short-term memory intact.

## 2018-03-05 NOTE — MR AVS SNAPSHOT
[address] [facility], [place], operated by [facility] 
 
 
 [address] [facility] [address] 
[phone] Patient: James Johnson MRN: [id number] ACO:[birth year] Visit Information Date & Time Provider Department Dept. Phone Encounter #  
 3/5/2018  8:00 AM Benigno Story, Ger [facility] [phone] [id number] Follow-up Instructions Return in about 3 months (around 6/5/2018) for physical.  
  
Your Appointments 3/5/2018  8:00 AM  
Follow Up with Benigno Story MD  
[facility] Appt Note: 3 month f/u  
 [address] [facility] [id number] [phone]  
  
   
 [address] Upcoming Health Maintenance Date Due  
 FOOT EXAM Q1 4/28/2018 MICROALBUMIN Q1 4/28/2018 LIPID PANEL Q1 4/28/2018 HEMOGLOBIN A1C Q6M 6/4/2018 EYE EXAM RETINAL OR DILATED Q1 9/7/2018 PAP AKA CERVICAL CYTOLOGY 4/11/2019 BREAST CANCER SCRN MAMMOGRAM 1/17/2020 COLONOSCOPY 5/22/2024 DTaP/Tdap/Td series (2 - Td) 9/22/2025 Allergies as of 3/5/2018  Review Complete On: 3/5/2018 By: Benigno Story MD  
 No Known Allergies Current Immunizations  Reviewed on 9/22/2015 Name Date Pneumococcal Polysaccharide (PPSV-23) 4/28/2017  8:41 AM  
 Tdap 9/22/2015  8:04 AM  
  
 Not reviewed this visit You Were Diagnosed With   
  
 Codes Comments Type 2 diabetes mellitus without complication, without long-term current use of insulin (HCC)    -  Primary ICD-10-CM: E11.9 ICD-9-CM: 250.00 Essential hypertension     ICD-10-CM: I10 
ICD-9-CM: 401.9 Class 1 obesity due to excess calories without serious comorbidity with body mass index (BMI) of 30.0 to 30.9 in adult     ICD-10-CM: E66.09, Z68.30 ICD-9-CM: 278.00, V85.30 Vitals BP Pulse Temp Resp Height(growth percentile) Weight(growth percentile) 140/90 (BP 1 Location: Left arm, BP Patient Position: Sitting) (!) 109 98.4 °F (36.9 °C) (Oral) 20 5' 3\" (1.6 m) 170 lb (77.1 kg) SpO2 BMI OB Status Smoking Status 99% 30.11 kg/m2 Menopause Never Smoker Vitals History BMI and BSA Data Body Mass Index Body Surface Area  
 30.11 kg/m 2 1.85 m 2 Preferred Pharmacy Pharmacy Name Phone [phone] [address] [email or web address] [address] [phone] Your Updated Medication List  
  
   
This list is accurate as of 3/5/18  7:50 AM.  Always use your most recent med list. amLODIPine 5 mg tablet Commonly known as:  Bette Peach Take 1 Tab by mouth daily. Indications: hypertension  
  
 aspirin delayed-release 81 mg tablet Take 1 Tab by mouth daily. atorvastatin 10 mg tablet Commonly known as:  LIPITOR Take 1 Tab by mouth daily. Blood-Glucose Meter monitoring kit Test glucose daily  
  
 ergocalciferol 50,000 unit capsule Commonly known as:  VITAMIN D2 Take 1 Cap by mouth every seven (7) days. glucose blood VI test strips strip Commonly known as:  blood glucose test  
Test glucose daily JANUVIA 100 mg tablet Generic drug:  SITagliptin  
take 1 tablet by mouth once daily Lancets Misc Test glucose daily  
  
 metFORMIN  mg tablet Commonly known as:  GLUCOPHAGE XR Take 1 Tab by mouth two (2) times a day. telmisartan-hydroCHLOROthiazide 80-25 mg per tablet Commonly known as:  MICARDIS HCT Take 1 Tab by mouth daily. Prescriptions Sent to Pharmacy Refills  
 amLODIPine (NORVASC) 5 mg tablet 0 Sig: Take 1 Tab by mouth daily. Indications: hypertension Class: Normal  
 Pharmacy: [facility] [address] [address] Ph #: [phone] Route: Oral  
  
We Performed the Following AMB POC HEMOGLOBIN A1C [19599 CPT(R)] Follow-up Instructions Return in about 3 months (around 6/5/2018) for physical.  
  
  
Patient Instructions Starting a Weight Loss Plan: Care Instructions Your Care Instructions If you are thinking about losing weight, it can be hard to know where to start. Your doctor can help you set up a weight loss plan that best meets your needs. You may want to take a class on nutrition or exercise, or join a weight loss support group. If you have questions about how to make changes to your eating or exercise habits, ask your doctor about seeing a registered dietitian or an exercise specialist. 
It can be a big challenge to lose weight. But you do not have to make huge changes at once. Make small changes, and stick with them. When those changes become habit, add a few more changes. If you do not think you are ready to make changes right now, try to pick a date in the future. Make an appointment to see your doctor to discuss whether the time is right for you to start a plan. Follow-up care is a key part of your treatment and safety. Be sure to make and go to all appointments, and call your doctor if you are having problems. It's also a good idea to know your test results and keep a list of the medicines you take. How can you care for yourself at home? · Set realistic goals. Many people expect to lose much more weight than is likely. A weight loss of 5% to 10% of your body weight may be enough to improve your health. · Get family and friends involved to provide support. Talk to them about why you are trying to lose weight, and ask them to help. They can help by participating in exercise and having meals with you, even if they may be eating something different. · Find what works best for you. If you do not have time or do not like to cook, a program that offers meal replacement bars or shakes may be better for you.  Or if you like to prepare meals, finding a plan that includes daily menus and recipes may be best. 
· Ask your doctor about other health professionals who can help you achieve your weight loss goals. ¨ A dietitian can help you make healthy changes in your diet. ¨ An exercise specialist or  can help you develop a safe and effective exercise program. 
¨ A counselor or psychiatrist can help you cope with issues such as depression, anxiety, or family problems that can make it hard to focus on weight loss. · Consider joining a support group for people who are trying to lose weight. Your doctor can suggest groups in your area. Where can you learn more? Go to http://subhaStartup Networkelmer.info/. Enter A707 in the search box to learn more about \"Starting a Weight Loss Plan: Care Instructions. \" Current as of: October 13, 2016 Content Version: 11.4 © 8205-4472 [employer]. Care instructions adapted under license by [employer] (which disclaims liability or warranty for this information). If you have questions about a medical condition or this instruction, always ask your healthcare professional. [place] [address] any warranty or liability for your use of this information. Introducing [facility]! Lamont Colindres introduces [employer] patient portal. Now you can access parts of your medical record, email your doctor's office, and request medication refills online. 1. In your internet browser, go to [email or web address] 2. Click on the First Time User? Click Here link in the Sign In box. You will see the New Member Sign Up page. 3. Enter your [employer] Access Code exactly as it appears below. You will not need to use this code after youve completed the sign-up process. If you do not sign up before the expiration date, you must request a new code. · [employer] Access Code: [email or web address] Expires: 6/3/2018  7:50 AM 
 
 4. Enter the last four digits of your Social Security Number (xxxx) and Date of Birth (mm/dd/yyyy) as indicated and click Submit. You will be taken to the next sign-up page. 5. Create a [employer] ID. This will be your [employer] login ID and cannot be changed, so think of one that is secure and easy to remember. 6. Create a [employer] password. You can change your password at any time. 7. Enter your Password Reset Question and Answer. This can be used at a later time if you forget your password. 8. Enter your e-mail address. You will receive e-mail notification when new information is available in [address] 9. Click Sign Up. You can now view and download portions of your medical record. 10. Click the Download Summary menu link to download a portable copy of your medical information. If you have questions, please visit the Frequently Asked Questions section of the [employer] website. Remember, [employer] is NOT to be used for urgent needs. For medical emergencies, dial 911. Now available from your iPhone and Android! Please provide this summary of care documentation to your next provider. Your primary care clinician is listed as Lilibeth 13. If you have any questions after today's visit, please call [phone].

## 2018-03-09 DIAGNOSIS — E11.9 TYPE 2 DIABETES MELLITUS WITHOUT COMPLICATION, WITHOUT LONG-TERM CURRENT USE OF INSULIN (HCC): ICD-10-CM

## 2018-03-09 RX ORDER — SITAGLIPTIN 100 MG/1
TABLET, FILM COATED ORAL
Qty: 90 TAB | Refills: 0 | Status: SHIPPED | OUTPATIENT
Start: 2018-03-09 | End: 2018-06-04 | Stop reason: SDUPTHER

## 2018-04-04 ENCOUNTER — TELEPHONE (OUTPATIENT)
Dept: FAMILY MEDICINE CLINIC | Age: 63
End: 2018-04-04

## 2018-04-04 DIAGNOSIS — N63.0 BREAST NODULE: ICD-10-CM

## 2018-04-04 NOTE — TELEPHONE ENCOUNTER
Please call pt and find out if she schedule us [facility]

## 2018-06-04 ENCOUNTER — OFFICE VISIT (OUTPATIENT)
Dept: FAMILY MEDICINE CLINIC | Age: 63
End: 2018-06-04

## 2018-06-04 VITALS
OXYGEN SATURATION: 96 % | RESPIRATION RATE: 19 BRPM | SYSTOLIC BLOOD PRESSURE: 138 MMHG | DIASTOLIC BLOOD PRESSURE: 80 MMHG | TEMPERATURE: 98.7 F | WEIGHT: 171 LBS | BODY MASS INDEX: 30.3 KG/M2 | HEART RATE: 112 BPM | HEIGHT: 63 IN

## 2018-06-04 DIAGNOSIS — E11.9 TYPE 2 DIABETES MELLITUS WITHOUT COMPLICATION, WITHOUT LONG-TERM CURRENT USE OF INSULIN (HCC): Primary | ICD-10-CM

## 2018-06-04 DIAGNOSIS — I10 ESSENTIAL HYPERTENSION: ICD-10-CM

## 2018-06-04 DIAGNOSIS — Z86.39 H/O VITAMIN D DEFICIENCY: ICD-10-CM

## 2018-06-04 DIAGNOSIS — Z78.0 POSTMENOPAUSAL: ICD-10-CM

## 2018-06-04 DIAGNOSIS — E78.00 PURE HYPERCHOLESTEROLEMIA: ICD-10-CM

## 2018-06-04 LAB
25(OH)D3 SERPL-MCNC: 40.9 NG/ML (ref 32–100)
A-G RATIO,AGRAT: 1.8 RATIO (ref 1.1–2.6)
ALBUMIN SERPL-MCNC: 4.9 G/DL (ref 3.5–5)
ALP SERPL-CCNC: 132 U/L (ref 40–120)
ALT SERPL-CCNC: 19 U/L (ref 5–40)
ANION GAP SERPL CALC-SCNC: 19 MMOL/L
AST SERPL W P-5'-P-CCNC: 16 U/L (ref 10–37)
AVG GLU, 10930: 195 MG/DL (ref 91–123)
BILIRUB SERPL-MCNC: 0.8 MG/DL (ref 0.2–1.2)
BUN SERPL-MCNC: 16 MG/DL (ref 6–22)
CALCIUM SERPL-MCNC: 9.9 MG/DL (ref 8.4–10.5)
CHLORIDE SERPL-SCNC: 96 MMOL/L (ref 98–110)
CHOLEST SERPL-MCNC: 169 MG/DL (ref 110–200)
CO2 SERPL-SCNC: 26 MMOL/L (ref 20–32)
CREAT SERPL-MCNC: 0.9 MG/DL (ref 0.8–1.4)
CREATININE, URINE: 167 MG/DL
GFRAA, 66117: >60
GFRNA, 66118: 59
GLOBULIN,GLOB: 2.8 G/DL (ref 2–4)
GLUCOSE SERPL-MCNC: 222 MG/DL (ref 70–99)
HBA1C MFR BLD HPLC: 8.4 % (ref 4.8–5.9)
HDLC SERPL-MCNC: 3.8 MG/DL (ref 0–5)
HDLC SERPL-MCNC: 45 MG/DL (ref 40–59)
LDLC SERPL CALC-MCNC: 92 MG/DL (ref 50–99)
MICROALB/CREAT RATIO, 140286: 11.3 MCG/MG OF CREATININE (ref 0–30)
MICROALBUMIN,URINE RANDOM 140054: 18.8 UG/ML (ref 0.1–17)
POTASSIUM SERPL-SCNC: 4.6 MMOL/L (ref 3.5–5.5)
PROT SERPL-MCNC: 7.7 G/DL (ref 6.2–8.1)
SODIUM SERPL-SCNC: 141 MMOL/L (ref 133–145)
TRIGL SERPL-MCNC: 162 MG/DL (ref 40–149)
VLDLC SERPL CALC-MCNC: 32 MG/DL (ref 8–30)

## 2018-06-04 RX ORDER — AMLODIPINE BESYLATE 5 MG/1
5 TABLET ORAL DAILY
Qty: 90 TAB | Refills: 1 | Status: SHIPPED | OUTPATIENT
Start: 2018-06-04 | End: 2018-12-03 | Stop reason: SDUPTHER

## 2018-06-04 RX ORDER — TELMISARTAN AND HYDROCHLORTHIAZIDE 80; 25 MG/1; MG/1
1 TABLET ORAL DAILY
Qty: 90 TAB | Refills: 1 | Status: SHIPPED | OUTPATIENT
Start: 2018-06-04 | End: 2018-12-03 | Stop reason: SDUPTHER

## 2018-06-04 RX ORDER — ATORVASTATIN CALCIUM 10 MG/1
10 TABLET, FILM COATED ORAL DAILY
Qty: 90 TAB | Refills: 1 | Status: SHIPPED | OUTPATIENT
Start: 2018-06-04 | End: 2018-08-28 | Stop reason: SDUPTHER

## 2018-06-04 RX ORDER — METFORMIN HYDROCHLORIDE 500 MG/1
500 TABLET, EXTENDED RELEASE ORAL 2 TIMES DAILY
Qty: 180 TAB | Refills: 1 | Status: SHIPPED | OUTPATIENT
Start: 2018-06-04 | End: 2018-12-03 | Stop reason: SDUPTHER

## 2018-06-04 NOTE — PATIENT INSTRUCTIONS
Dual-Energy X-Ray Absorptiometry (DXA) Test: About This Test  What is it? Dual-energy X-ray absorptiometry (DXA) is a test that uses two different X-ray beams to check bone thickness (density) in your spine and hip. This information is used to estimate the strength of your bones. Why is this test done? A DXA test is done to check for bone thinning and weakness, which can make it easier for you to break a bone. It is often done for:  · People who are at risk for osteoporosis, including:  ¨ Women who are age 72 and older. ¨ Older men. ¨ People who take some medications, such as corticosteroids. ¨ People who have certain medical conditions, such as hyperparathyroidism. · People who have osteoporosis, to see how well treatment is working. What happens before the test?  · Women who are pregnant should not have this test. Let your doctor know if you are or might be pregnant. · You may be able to leave your clothes on for the test, but you will remove any metal buttons or faye for the test.  What happens during the test?  · You will lie down on your back on a padded table. · You may need to lie with your legs straight or with your lower legs resting on a platform built into the table. · The machine will scan your bones and measure the amount of radiation they absorb. During this test you are exposed to a very low dose of radiation. How long does the test take? · The test will take about 20 minutes. What happens after the test?  · You will probably be able to go home right away. · You can go back to your usual activities right away. Follow-up care is a key part of your treatment and safety. Be sure to make and go to all appointments, and call your doctor if you are having problems. It's also a good idea to keep a list of the medicines you take. Ask your doctor when you can expect to have your test results. Where can you learn more? Go to http://subha-elmer.info/.   Enter I437 in the search box to learn more about \"Dual-Energy X-Ray Absorptiometry (DXA) Test: About This Test.\"  Current as of: May 12, 2017  Content Version: 11.4  © 3343-2753 Healthwise, [employer]. Care instructions adapted under license by [employer] (which disclaims liability or warranty for this information). If you have questions about a medical condition or this instruction, always ask your healthcare professional. [place] [address] any warranty or liability for your use of this information.

## 2018-06-04 NOTE — MR AVS SNAPSHOT
[address] [facility] 
 
 
 [address] [facility] [address] [phone] Patient: Gabe Massey MRN: [id number] Visit Information Date & Time Provider Department Dept. Phone Encounter #  
 6/4/2018  8:00 AM Reyes Wallace, 3 [facility] [phone] [id number] Your Appointments 6/4/2018  8:00 AM  
Follow Up with Reyes Wallace MD  
3 [address]) Appt Note: 2 month f/u appt [address] [facility] [address] [phone]  
  
   
 [address] Upcoming Health Maintenance Date Due MICROALBUMIN Q1 4/28/2018 LIPID PANEL Q1 4/28/2018 Bone Densitometry (Dexa) Screening 5/27/2018 Pneumococcal 65+ Low/Medium Risk (1 of 2 - PCV13) 5/27/2018 Influenza Age 5 to Adult 8/1/2018 HEMOGLOBIN A1C Q6M 9/5/2018 EYE EXAM RETINAL OR DILATED Q1 9/7/2018 PAP AKA CERVICAL CYTOLOGY 4/11/2019 FOOT EXAM Q1 6/4/2019 GLAUCOMA SCREENING Q2Y 9/7/2019 BREAST CANCER SCRN MAMMOGRAM 1/17/2020 COLONOSCOPY 5/22/2024 DTaP/Tdap/Td series (2 - Td) 9/22/2025 Allergies as of 6/4/2018  Review Complete On: 6/4/2018 By: Peace Nava LPN No Known Allergies Current Immunizations  Reviewed on 9/22/2015 Name Date Pneumococcal Polysaccharide (PPSV-23) 4/28/2017  8:41 AM  
 Tdap 9/22/2015  8:04 AM  
  
 Not reviewed this visit You Were Diagnosed With   
  
 Codes Comments Type 2 diabetes mellitus without complication, without long-term current use of insulin (HCC)    -  Primary ICD-10-CM: E11.9 ICD-9-CM: 250.00 Essential hypertension     ICD-10-CM: I10 
ICD-9-CM: 401.9 Pure hypercholesterolemia     ICD-10-CM: E78.00 ICD-9-CM: 272.0 H/O vitamin D deficiency     ICD-10-CM: Z86.39 
ICD-9-CM: V12.1 Postmenopausal     ICD-10-CM: Z78.0 ICD-9-CM: V49.81   
  
 Vitals BP Pulse Temp Resp Height(growth percentile) Weight(growth percentile) 138/80 (BP 1 Location: Left arm, BP Patient Position: Sitting) (!) 112 98.7 °F (37.1 °C) (Oral) 19 5' 3\" (1.6 m) 171 lb (77.6 kg) SpO2 BMI OB Status Smoking Status 96% 30.29 kg/m2 Menopause Never Smoker Vitals History BMI and BSA Data Body Mass Index Body Surface Area  
 30.29 kg/m 2 1.86 m 2 Preferred Pharmacy Pharmacy Name Phone [id number] [address] [phone] Your Updated Medication List  
  
   
This list is accurate as of 6/4/18  7:57 AM.  Always use your most recent med list. amLODIPine 5 mg tablet Commonly known as:  Sundra [place] Take 1 Tab by mouth daily. Indications: hypertension  
  
 aspirin delayed-release 81 mg tablet Take 1 Tab by mouth daily. atorvastatin 10 mg tablet Commonly known as:  LIPITOR Take 1 Tab by mouth daily. Blood-Glucose Meter monitoring kit Test glucose daily  
  
 ergocalciferol 50,000 unit capsule Commonly known as:  VITAMIN D2 Take 1 Cap by mouth every seven (7) days. glucose blood VI test strips strip Commonly known as:  blood glucose test  
Test glucose daily ICAPS AREDS PO Take  by mouth. Lancets Misc Test glucose daily  
  
 metFORMIN  mg tablet Commonly known as:  GLUCOPHAGE XR Take 1 Tab by mouth two (2) times a day. Indications: type 2 diabetes mellitus SITagliptin 100 mg tablet Commonly known as:  Ulisses [place]  
take 1 tablet by mouth once daily  Indications: type 2 diabetes mellitus  
  
 telmisartan-hydroCHLOROthiazide 80-25 mg per tablet Commonly known as:  MICARDIS HCT Take 1 Tab by mouth daily. Indications: hypertension Prescriptions Sent to Pharmacy Refills SITagliptin (JANUVIA) 100 mg tablet 1  Sig: take 1 tablet by mouth once daily  Indications: type 2 diabetes mellitus Class: Normal  
 [facility]: [id number] [address] [place] [address] Ph #: [phone]  
 amLODIPine (NORVASC) 5 mg tablet 1 Sig: Take 1 Tab by mouth daily. Indications: hypertension Class: Normal  
 [facility]: [facility] [address] [place] [address] Ph #: [phone] Route: Oral  
 metFORMIN ER (GLUCOPHAGE XR) 500 mg tablet 1 Sig: Take 1 Tab by mouth two (2) times a day. Indications: type 2 diabetes mellitus Class: Normal  
 [facility] [place] [address] Ph #: [phone] Route: Oral  
 atorvastatin (LIPITOR) 10 mg tablet 1 Sig: Take 1 Tab by mouth daily. Class: Normal  
 [facility] [place] [address] Ph #: [phone] Route: Oral  
 telmisartan-hydroCHLOROthiazide (MICARDIS HCT) 80-25 mg per tablet 1 Sig: Take 1 Tab by mouth daily. Indications: hypertension Class: Normal  
 [facility] [facility] [place] [address] Ph #: [phone] Route: Oral  
  
We Performed the Following  DIABETES FOOT EXAM [7 Custom] To-Do List   
 06/04/2018 Imaging:  DEXA BONE DENSITY STUDY AXIAL   
  
 06/04/2018 Lab:  HEMOGLOBIN A1C W/O EAG   
  
 06/04/2018 Lab:  LIPID PANEL   
  
 06/04/2018 Lab:  METABOLIC PANEL, COMPREHENSIVE   
  
 06/04/2018 Lab:  MICROALBUMIN, UR, RAND W/ MICROALB/CREAT RATIO   
  
 06/04/2018 Lab:  VITAMIN D, 25 HYDROXY Referral Information Referral ID Referred By Referred To  
  
 [id number] Belk[facility] Not Available Visits Status Start Date End Date 1 New Request 6/4/18 6/4/19 If your referral has a status of pending review or denied, additional information will be sent to support the outcome of this decision. Patient Instructions Dual-Energy X-Ray Absorptiometry (DXA) Test: About This Test 
What is it? Dual-energy X-ray absorptiometry (DXA) is a test that uses two different X-ray beams to check bone thickness (density) in your spine and hip. This information is used to estimate the strength of your bones. Why is this test done? A DXA test is done to check for bone thinning and weakness, which can make it easier for you to break a bone. It is often done for: 
· People who are at risk for osteoporosis, including: ¨ Women who are age 72 and older. ¨ Older men. ¨ People who take some medications, such as corticosteroids. ¨ People who have certain medical conditions, such as hyperparathyroidism. · People who have osteoporosis, to see how well treatment is working. What happens before the test? 
· Women who are pregnant should not have this test. Let your doctor know if you are or might be pregnant. · You may be able to leave your clothes on for the test, but you will remove any metal buttons or faye for the test. 
What happens during the test? 
· You will lie down on your back on a padded table. · You may need to lie with your legs straight or with your lower legs resting on a platform built into the table. · The machine will scan your bones and measure the amount of radiation they absorb. During this test you are exposed to a very low dose of radiation. How long does the test take? · The test will take about 20 minutes. What happens after the test? 
· You will probably be able to go home right away. · You can go back to your usual activities right away. Follow-up care is a key part of your treatment and safety. Be sure to make and go to all appointments, and call your doctor if you are having problems. It's also a good idea to keep a list of the medicines you take. Ask your doctor when you can expect to have your test results. Where can you learn more? Go to http://subha-elmer.info/. Enter G059 in the search box to learn more about \"Dual-Energy X-Ray Absorptiometry (DXA) Test: About This Test.\" Current as of: May 12, 2017 Content Version: 11.4 © 3141-4500 Healthwise, Incorporated. Care instructions adapted under license by [employer] (which disclaims liability or warranty for this information). If you have questions about a medical condition or this instruction, always ask your healthcare professional. [address] any warranty or liability for your use of this information. Introducing [facility]! [facility] introduces GenVault patient portal. Now you can access parts of your medical record, email your doctor's office, and request medication refills online. 1. In your internet browser, go to [email or web address] 2. Click on the First Time User? Click Here link in the Sign In box. You will see the New Member Sign Up page. 3. Enter your GenVault Access Code exactly as it appears below. You will not need to use this code after youve completed the sign-up process. If you do not sign up before the expiration date, you must request a new code. · GenVault Access Code: [email or web address] Expires: 9/2/2018  7:57 AM 
 
4. Enter the last four digits of your Social Security Number (xxxx) and Date of Birth (mm/dd/yyyy) as indicated and click Submit. You will be taken to the next sign-up page. 5. Create a GenVault ID. This will be your GenVault login ID and cannot be changed, so think of one that is secure and easy to remember. 6. Create a GenVault password. You can change your password at any time. 7. Enter your Password Reset Question and Answer. This can be used at a later time if you forget your password. 8. Enter your e-mail address. You will receive e-mail notification when new information is available in [address] 9. Click Sign Up. You can now view and download portions of your medical record. 10. Click the Download Summary menu link to download a portable copy of your medical information. If you have questions, please visit the Frequently Asked Questions section of the [employer] website. Remember, [employer] is NOT to be used for urgent needs. For medical emergencies, dial 911. Now available from your iPhone and Android! Please provide this summary of care documentation to your next provider. Your primary care clinician is listed as Lilibeth 13. If you have any questions after today's visit, please call [phone].

## 2018-06-04 NOTE — PROGRESS NOTES
Assessment/Plan:    1. Type 2 diabetes mellitus without complication, without long-term current use of insulin ([facility] [address]  -cont current. Ck labs. - HEMOGLOBIN A1C W/O EAG; Future  - MICROALBUMIN, UR, RAND W/ MICROALB/CREAT RATIO; Future  - HM DIABETES FOOT EXAM  - SITagliptin (JANUVIA) 100 mg tablet; take 1 tablet by mouth once daily  Indications: type 2 diabetes mellitus  Dispense: 90 Tab; Refill: 1  - metFORMIN ER (GLUCOPHAGE XR) 500 mg tablet; Take 1 Tab by mouth two (2) times a day. Indications: type 2 diabetes mellitus  Dispense: 180 Tab; Refill: 1    2. Essential hypertension  -cont current. F/u in 6mos.  - METABOLIC PANEL, COMPREHENSIVE; Future  - LIPID PANEL; Future  - amLODIPine (NORVASC) 5 mg tablet; Take 1 Tab by mouth daily. Indications: hypertension  Dispense: 90 Tab; Refill: 1  - telmisartan-hydroCHLOROthiazide (MICARDIS HCT) 80-25 mg per tablet; Take 1 Tab by mouth daily. Indications: hypertension  Dispense: 90 Tab; Refill: 1    3. Pure hypercholesterolemia  -on statin  - METABOLIC PANEL, COMPREHENSIVE; Future  - LIPID PANEL; Future  - atorvastatin (LIPITOR) 10 mg tablet; Take 1 Tab by mouth daily. Dispense: 90 Tab; Refill: 1    4. H/O vitamin D deficiency  - VITAMIN D, 25 HYDROXY; Future    5. Postmenopausal  - DEXA BONE DENSITY STUDY AXIAL; Future    Pt reminded to schedule breast u/s that was ordered 1/2018. The plan was discussed with the patient. The patient verbalized understanding and is in agreement with the plan. All medication potential side effects were discussed with the patient.     Health Maintenance:   Health Maintenance   Topic Date Due    MICROALBUMIN Q1  04/28/2018    LIPID PANEL Q1  04/28/2018    Bone Densitometry (Dexa) Screening  05/27/2018    Pneumococcal 65+ Low/Medium Risk (1 of 2 - PCV13) 05/27/2018    Influenza Age 9 to Adult  08/01/2018    HEMOGLOBIN A1C Q6M  09/05/2018    EYE EXAM RETINAL OR DILATED Q1  09/07/2018    PAP AKA CERVICAL CYTOLOGY  04/11/2019    FOOT EXAM Q1  06/04/2019    GLAUCOMA SCREENING Q2Y  09/07/2019    BREAST CANCER SCRN MAMMOGRAM  01/17/2020    COLONOSCOPY  05/22/2024    DTaP/Tdap/Td series (2 - Td) 09/22/2025    Hepatitis C Screening  Completed    ZOSTER VACCINE AGE 60>  Addressed       Maxine Courser is a 70 y.o. female and presents with Hypertension and Cold Symptoms (cough)     Subjective:  DM2 - due for labs. on januvia and metformin. Had bilat breast nodules this year. U/s was recommended, but never done. HTN - bp better on norvasc. Notes cold sx. Notes 2 day h/o nasal discharge, PND, cough. No f/c.     ROS:  Constitutional: No recent weight change. No weakness/fatigue. No f/c. Skin: No rashes, change in nails/hair, itching   HENT: No HA, dizziness. No hearing loss/tinnitus. No nasal congestion/discharge. Eyes: No change in vision, double/blurred vision or eye pain/redness. Cardiovascular: No CP/palpitations. No MAGANA/orthopnea/PND. Respiratory: No cough/sputum, dyspnea, wheezing. Gastointestinal: No dysphagia, reflux. No n/v. No constipation/diarrhea. No melena/rectal bleeding. Genitourinary: No dysuria, urinary hesitancy, nocturia, hematuria. No incontinence. Musculoskeletal: No joint pain/stiffness. No muscle pain/tenderness. Endo: No heat/cold intolerance, no polyuria/polydypsia. Heme: No h/o anemia. No easy bleeding/bruising. Allergy/Immunology: No seasonal rhinitis. Denies frequent colds, sinus/ear infections. Neurological: No seizures/numbness/weakness. No paresthesias. Psychiatric:  No depression, anxiety. The problem list was updated as a part of today's visit.   Patient Active Problem List   Diagnosis Code    Type 2 diabetes mellitus without complication (HCC) R10.2    Essential hypertension I10    H/O vitamin D deficiency Z86.39    Gastroesophageal reflux disease without esophagitis K21.9    Allergic rhinitis due to allergen J30.9    Macular degeneration, bilateral H35.30    Pure hypercholesterolemia E78.00    Hives L50.9    Breast nodule N63.0       The PSH, FH were reviewed. SH:  Social History   Substance Use Topics    Smoking status: Never Smoker    Smokeless tobacco: Never Used    Alcohol use No       Medications/Allergies:  Current Outpatient Prescriptions on File Prior to Visit   Medication Sig Dispense Refill    JANUVIA 100 mg tablet take 1 tablet by mouth once daily 90 Tab 0    amLODIPine (NORVASC) 5 mg tablet Take 1 Tab by mouth daily. Indications: hypertension 90 Tab 0    JANUVIA 100 mg tablet take 1 tablet by mouth once daily 90 Tab 0    metFORMIN ER (GLUCOPHAGE XR) 500 mg tablet Take 1 Tab by mouth two (2) times a day. 180 Tab 1    ergocalciferol (VITAMIN D2) 50,000 unit capsule Take 1 Cap by mouth every seven (7) days. 12 Cap 3    atorvastatin (LIPITOR) 10 mg tablet Take 1 Tab by mouth daily. 90 Tab 1    telmisartan-hydroCHLOROthiazide (MICARDIS HCT) 80-25 mg per tablet Take 1 Tab by mouth daily. 90 Tab 1    Blood-Glucose Meter monitoring kit Test glucose daily 1 Kit 0    Lancets misc Test glucose daily 1 Each 11    aspirin delayed-release 81 mg tablet Take 1 Tab by mouth daily. 90 Tab 0    glucose blood VI test strips (BLOOD GLUCOSE TEST) strip Test glucose daily 100 Strip 11     No current facility-administered medications on file prior to visit. No Known Allergies    Objective:  Visit Vitals    /80 (BP 1 Location: Left arm, BP Patient Position: Sitting)    Pulse (!) 112    Temp 98.7 °F (37.1 °C) (Oral)    Resp 19    Ht 5' 3\" (1.6 m)    Wt 171 lb (77.6 kg)    SpO2 96%    BMI 30.29 kg/m2      Constitutional: Well developed, nourished, no distress, alert, obese habitus   CV: S1, S2.  RRR. No murmurs/rubs. No thrills palpated. No carotid bruits. Intact distal pulses. No edema. Pulm: No abnormalities on inspection. Clear to auscultation bilaterally. No wheezing/rhonchi. Normal effort. GI: Soft, nontender, nondistended. Normal active bowel sounds.     Monofilament nml bilat

## 2018-06-04 NOTE — PROGRESS NOTES
Viky Zuleta is a 70 y.o. female (: 1955) presenting to address:    Chief Complaint   Patient presents with    Hypertension    Cold Symptoms     cough       Vitals:    18 0743   BP: 138/80   Pulse: (!) 112   Resp: 19   Temp: 98.7 °F (37.1 °C)   TempSrc: Oral   SpO2: 96%   Weight: 171 lb (77.6 kg)   Height: 5' 3\" (1.6 m)   PainSc:   0 - No pain       Hearing/Vision:   No exam data present    Learning Assessment:     Learning Assessment 2016   PRIMARY LEARNER Patient   HIGHEST LEVEL OF EDUCATION - PRIMARY LEARNER  4 YEARS OF COLLEGE   BARRIERS PRIMARY LEARNER NONE   CO-LEARNER CAREGIVER No   PRIMARY LANGUAGE ENGLISH   LEARNER PREFERENCE PRIMARY LISTENING   ANSWERED BY self   RELATIONSHIP SELF     Depression Screening:     PHQ over the last two weeks 2018   Little interest or pleasure in doing things Not at all   Feeling down, depressed or hopeless Not at all   Total Score PHQ 2 0     Fall Risk Assessment:     Fall Risk Assessment, last 12 mths 2018   Able to walk? Yes   Fall in past 12 months? No     Abuse Screening:     Abuse Screening Questionnaire 2018   Do you ever feel afraid of your partner? N   Are you in a relationship with someone who physically or mentally threatens you? N   Is it safe for you to go home? Y     Coordination of Care Questionaire:   1. Have you been to the ER, urgent care clinic since your last visit? Hospitalized since your last visit? No     2. Have you seen or consulted any other health care providers outside of the [address] [place] [address] since your last visit? Include any pap smears or colon screening. Yes, eye exam May 2018 Dr Luzmaria Cha   Advanced Directive:   1. Do you have an Advanced Directive? No   2. Would you like information on Advanced Directives?   No  Health Maintenance Due   Topic Date Due    MICROALBUMIN Q1  2018    LIPID PANEL Q1  2018    Bone Densitometry (Dexa) Screening  2018    Pneumococcal 65+ Low/Medium Risk (1 of 2 - PCV13) 05/27/2018

## 2018-06-05 NOTE — PROGRESS NOTES
Tell pt her sugars are significantly worse than previous, a1c 8.4 (from 6.8). I'm recommending she start additional med (amaryl). This is in addition to [facility] [place] and [place] and metformin. Does she want me to send that in. I want to see her back in office in 2 mos. Otherwise, labs were good.

## 2018-08-28 ENCOUNTER — OFFICE VISIT (OUTPATIENT)
Dept: FAMILY MEDICINE CLINIC | Age: 63
End: 2018-08-28

## 2018-08-28 VITALS
TEMPERATURE: 98.4 F | HEIGHT: 63 IN | WEIGHT: 173 LBS | OXYGEN SATURATION: 98 % | DIASTOLIC BLOOD PRESSURE: 80 MMHG | SYSTOLIC BLOOD PRESSURE: 132 MMHG | HEART RATE: 102 BPM | BODY MASS INDEX: 30.65 KG/M2 | RESPIRATION RATE: 20 BRPM

## 2018-08-28 DIAGNOSIS — N63.0 BREAST NODULE: ICD-10-CM

## 2018-08-28 DIAGNOSIS — E78.00 PURE HYPERCHOLESTEROLEMIA: ICD-10-CM

## 2018-08-28 DIAGNOSIS — E11.9 TYPE 2 DIABETES MELLITUS WITHOUT COMPLICATION, WITHOUT LONG-TERM CURRENT USE OF INSULIN (HCC): Primary | ICD-10-CM

## 2018-08-28 RX ORDER — ATORVASTATIN CALCIUM 40 MG/1
40 TABLET, FILM COATED ORAL DAILY
Qty: 90 TAB | Refills: 1 | Status: SHIPPED | OUTPATIENT
Start: 2018-08-28 | End: 2019-02-26 | Stop reason: SDUPTHER

## 2018-08-28 NOTE — PROGRESS NOTES
Assessment/Plan: 1. Type 2 diabetes mellitus without complication, without long-term current use of insulin (HCC) 
-ck a1c in 9/2018. Pt didn't start amaryl yet. If >7, will start amaryl. 
- HEMOGLOBIN A1C W/O EAG; Future 2. Pure hypercholesterolemia 
-incr dose to 40mg 
- atorvastatin (LIPITOR) 40 mg tablet; Take 1 Tab by mouth daily. Dispense: 90 Tab; Refill: 1 3. Breast nodule 
-discussed possibility of malignancy vs cyst. Pt declines all further eval. 
 
The plan was discussed with the patient. The patient verbalized understanding and is in agreement with the plan. All medication potential side effects were discussed with the patient. Health Maintenance:  Bone density scheduled. Health Maintenance Topic Date Due  Bone Densitometry (Dexa) Screening  05/27/2018  Pneumococcal 65+ Low/Medium Risk (1 of 2 - PCV13) 05/27/2018  Influenza Age 5 to Adult  08/01/2018  
 EYE EXAM RETINAL OR DILATED Q1  09/07/2018  HEMOGLOBIN A1C Q6M  12/04/2018  
 FOOT EXAM Q1  06/04/2019  MICROALBUMIN Q1  06/04/2019  LIPID PANEL Q1  06/04/2019  GLAUCOMA SCREENING Q2Y  09/07/2019  BREAST CANCER SCRN MAMMOGRAM  01/17/2020  COLONOSCOPY  05/22/2024  
 DTaP/Tdap/Td series (2 - Td) 09/22/2025  Hepatitis C Screening  Completed  ZOSTER VACCINE AGE 60>  Addressed Karan Galicia is a 70 y.o. female and presents with Diabetes Subjective: 
DM2 - A1c were worse and so we started amaryl, but she wasn't able to pick it up. HTN - bp good. HLD - LDL good, only on lipitor 10mg ROS: 
Constitutional: No recent weight change. No weakness/fatigue. No f/c. Cardiovascular: No CP/palpitations. No MAGANA/orthopnea/PND. Respiratory: No cough/sputum, dyspnea, wheezing. Gastointestinal: No dysphagia, reflux. No n/v. No constipation/diarrhea. No melena/rectal bleeding. Genitourinary: No dysuria, urinary hesitancy, nocturia, hematuria. No incontinence. Musculoskeletal: No joint pain/stiffness. No muscle pain/tenderness. Endo: No heat/cold intolerance, no polyuria/polydypsia. Heme: No h/o anemia. No easy bleeding/bruising. Allergy/Immunology: No seasonal rhinitis. Denies frequent colds, sinus/ear infections. Neurological: No seizures/numbness/weakness. No paresthesias. Psychiatric:  No depression, anxiety. The problem list was updated as a part of today's visit. Patient Active Problem List  
Diagnosis Code  Type 2 diabetes mellitus without complication ([facility]) I03.3  
 Essential hypertension I10  
 H/O vitamin D deficiency Z86.39  
 Gastroesophageal reflux disease without esophagitis K21.9  Allergic rhinitis due to allergen J30.9  Macular degeneration, bilateral H35.30  Pure hypercholesterolemia E78.00  
 Hives L50.9  Breast nodule N63.0 The PSH, FH were reviewed. SH: Social History Substance Use Topics  Smoking status: Never Smoker  Smokeless tobacco: Never Used  Alcohol use No  
 
 
Medications/Allergies: 
Current Outpatient Prescriptions on File Prior to Visit Medication Sig Dispense Refill  vit A/vit C/vit E/zinc/copper (ICAPS AREDS PO) Take  by mouth.  SITagliptin (JANUVIA) 100 mg tablet take 1 tablet by mouth once daily  Indications: type 2 diabetes mellitus 90 Tab 1  
 amLODIPine (NORVASC) 5 mg tablet Take 1 Tab by mouth daily. Indications: hypertension 90 Tab 1  
 metFORMIN ER (GLUCOPHAGE XR) 500 mg tablet Take 1 Tab by mouth two (2) times a day. Indications: type 2 diabetes mellitus 180 Tab 1  
 atorvastatin (LIPITOR) 10 mg tablet Take 1 Tab by mouth daily. 90 Tab 1  
 telmisartan-hydroCHLOROthiazide (MICARDIS HCT) 80-25 mg per tablet Take 1 Tab by mouth daily. Indications: hypertension 90 Tab 1  
 ergocalciferol (VITAMIN D2) 50,000 unit capsule Take 1 Cap by mouth every seven (7) days. 12 Cap 3  
 aspirin delayed-release 81 mg tablet Take 1 Tab by mouth daily.  90 Tab 0  
  Blood-Glucose Meter monitoring kit Test glucose daily 1 Kit 0  
 glucose blood VI test strips (BLOOD GLUCOSE TEST) strip Test glucose daily 100 Strip 11  Lancets misc Test glucose daily 1 Each 11 No current facility-administered medications on file prior to visit. No Known Allergies Objective: 
Visit Vitals  /80 (BP 1 Location: Left arm, BP Patient Position: Sitting)  Pulse (!) 102  Temp 98.4 °F (36.9 °C) (Oral)  Resp 20  
 Ht 5' 3\" (1.6 m)  Wt 173 lb (78.5 kg)  SpO2 98%  BMI 30.65 kg/m2 Constitutional: Well developed, nourished, no distress, alert, obese habitus CV: S1, S2.  RRR. No murmurs/rubs. No thrills palpated. No carotid bruits. Intact distal pulses. No edema. No aortic bruits. Pulm: No abnormalities on inspection. Clear to auscultation bilaterally. No wheezing/rhonchi. Normal effort. GI: Soft, nontender, nondistended. Normal active bowel sounds.

## 2018-08-28 NOTE — PROGRESS NOTES
Dione Rodriguez is a 70 y.o. female (: 1955) presenting to address:    Chief Complaint   Patient presents with    Diabetes       Vitals:    18 0812   BP: 132/80   Pulse: (!) 102   Resp: 20   Temp: 98.4 °F (36.9 °C)   TempSrc: Oral   SpO2: 98%   Weight: 173 lb (78.5 kg)   Height: 5' 3\" (1.6 m)   PainSc:   0 - No pain       Learning Assessment:     Learning Assessment 2016   PRIMARY LEARNER Patient   HIGHEST LEVEL OF EDUCATION - PRIMARY LEARNER  4 YEARS OF COLLEGE   BARRIERS PRIMARY LEARNER NONE   CO-LEARNER CAREGIVER No   PRIMARY LANGUAGE ENGLISH   LEARNER PREFERENCE PRIMARY LISTENING   ANSWERED BY self   RELATIONSHIP SELF     Depression Screening:     PHQ over the last two weeks 2018   Little interest or pleasure in doing things Not at all   Feeling down, depressed, irritable, or hopeless Not at all   Total Score PHQ 2 0     Fall Risk Assessment:     Fall Risk Assessment, last 12 mths 2018   Able to walk? Yes   Fall in past 12 months? No     Abuse Screening:     Abuse Screening Questionnaire 2018   Do you ever feel afraid of your partner? N   Are you in a relationship with someone who physically or mentally threatens you? N   Is it safe for you to go home? Y     Coordination of Care Questionaire:   1. Have you been to the ER, urgent care clinic since your last visit? Hospitalized since your last visit? NO    2. Have you seen or consulted any other health care providers outside of the [address] [place] [address] since your last visit? Include any pap smears or colon screening. NO    Advanced Directive:   1. Do you have an Advanced Directive? YES    2. Would you like information on Advanced Directives?  NO

## 2018-09-04 ENCOUNTER — HOSPITAL ENCOUNTER (OUTPATIENT)
Dept: LAB | Age: 63
Discharge: HOME | End: 2018-09-04
Payer: MEDICARE

## 2018-09-04 DIAGNOSIS — E11.9 TYPE 2 DIABETES MELLITUS WITHOUT COMPLICATION, WITHOUT LONG-TERM CURRENT USE OF INSULIN (HCC): ICD-10-CM

## 2018-09-04 LAB — HBA1C MFR BLD: 7.5 % (ref 4.2–5.6)

## 2018-09-04 PROCEDURE — 83036 HEMOGLOBIN GLYCOSYLATED A1C: CPT | Performed by: INTERNAL MEDICINE

## 2018-09-04 PROCEDURE — 36415 COLL VENOUS BLD VENIPUNCTURE: CPT | Performed by: INTERNAL MEDICINE

## 2018-09-07 RX ORDER — GLIMEPIRIDE 1 MG/1
1 TABLET ORAL
Qty: 90 TAB | Refills: 0 | Status: SHIPPED | OUTPATIENT
Start: 2018-09-07 | End: 2018-12-03 | Stop reason: SDUPTHER

## 2018-09-07 NOTE — PROGRESS NOTES
Spoke to pt re: recent lab results and MD recommendations. Pt will start amaryl. Please send to [facility].

## 2018-09-11 ENCOUNTER — HOSPITAL ENCOUNTER (OUTPATIENT)
Dept: GENERAL RADIOLOGY | Age: 63
Discharge: HOME | End: 2018-09-11
Attending: INTERNAL MEDICINE
Payer: MEDICARE

## 2018-09-11 DIAGNOSIS — Z78.0 POSTMENOPAUSAL: ICD-10-CM

## 2018-09-11 PROCEDURE — 77080 DXA BONE DENSITY AXIAL: CPT

## 2018-09-12 PROBLEM — M85.851 OSTEOPENIA OF NECK OF RIGHT FEMUR: Status: ACTIVE | Noted: 2018-09-12

## 2018-12-03 ENCOUNTER — OFFICE VISIT (OUTPATIENT)
Dept: FAMILY MEDICINE CLINIC | Age: 63
End: 2018-12-03

## 2018-12-03 ENCOUNTER — HOSPITAL ENCOUNTER (OUTPATIENT)
Dept: LAB | Age: 63
Discharge: HOME | End: 2018-12-03
Payer: MEDICARE

## 2018-12-03 VITALS
HEIGHT: 63 IN | WEIGHT: 174 LBS | DIASTOLIC BLOOD PRESSURE: 80 MMHG | RESPIRATION RATE: 20 BRPM | HEART RATE: 93 BPM | OXYGEN SATURATION: 97 % | SYSTOLIC BLOOD PRESSURE: 120 MMHG | TEMPERATURE: 98.5 F | BODY MASS INDEX: 30.83 KG/M2

## 2018-12-03 DIAGNOSIS — I10 ESSENTIAL HYPERTENSION: ICD-10-CM

## 2018-12-03 DIAGNOSIS — E78.00 PURE HYPERCHOLESTEROLEMIA: ICD-10-CM

## 2018-12-03 DIAGNOSIS — E55.9 VITAMIN D DEFICIENCY: ICD-10-CM

## 2018-12-03 DIAGNOSIS — E11.9 TYPE 2 DIABETES MELLITUS WITHOUT COMPLICATION, WITHOUT LONG-TERM CURRENT USE OF INSULIN (HCC): Primary | ICD-10-CM

## 2018-12-03 DIAGNOSIS — E66.09 CLASS 1 OBESITY DUE TO EXCESS CALORIES WITH SERIOUS COMORBIDITY AND BODY MASS INDEX (BMI) OF 30.0 TO 30.9 IN ADULT: ICD-10-CM

## 2018-12-03 DIAGNOSIS — Z00.00 INITIAL MEDICARE ANNUAL WELLNESS VISIT: ICD-10-CM

## 2018-12-03 DIAGNOSIS — E11.9 TYPE 2 DIABETES MELLITUS WITHOUT COMPLICATION, WITHOUT LONG-TERM CURRENT USE OF INSULIN (HCC): ICD-10-CM

## 2018-12-03 LAB
ALBUMIN SERPL-MCNC: 4.2 G/DL (ref 3.4–5)
ALBUMIN/GLOB SERPL: 1.2 {RATIO} (ref 0.8–1.7)
ALP SERPL-CCNC: 92 U/L (ref 45–117)
ALT SERPL-CCNC: 32 U/L (ref 13–56)
ANION GAP SERPL CALC-SCNC: 8 MMOL/L (ref 3–18)
AST SERPL-CCNC: 23 U/L (ref 15–37)
BILIRUB SERPL-MCNC: 0.6 MG/DL (ref 0.2–1)
BUN SERPL-MCNC: 17 MG/DL (ref 7–18)
BUN/CREAT SERPL: 20 (ref 12–20)
CALCIUM SERPL-MCNC: 9.4 MG/DL (ref 8.5–10.1)
CHLORIDE SERPL-SCNC: 102 MMOL/L (ref 100–108)
CHOLEST SERPL-MCNC: 135 MG/DL
CO2 SERPL-SCNC: 29 MMOL/L (ref 21–32)
CREAT SERPL-MCNC: 0.87 MG/DL (ref 0.6–1.3)
ERYTHROCYTE [DISTWIDTH] IN BLOOD BY AUTOMATED COUNT: 13.5 % (ref 11.6–14.5)
GLOBULIN SER CALC-MCNC: 3.6 G/DL (ref 2–4)
GLUCOSE SERPL-MCNC: 123 MG/DL (ref 74–99)
HBA1C MFR BLD: 7.1 % (ref 4.2–5.6)
HCT VFR BLD AUTO: 42.3 % (ref 35–45)
HDLC SERPL-MCNC: 43 MG/DL (ref 40–60)
HDLC SERPL: 3.1 {RATIO} (ref 0–5)
HGB BLD-MCNC: 13.4 G/DL (ref 12–16)
LDLC SERPL CALC-MCNC: 62 MG/DL (ref 0–100)
LIPID PROFILE,FLP: ABNORMAL
MCH RBC QN AUTO: 28.5 PG (ref 24–34)
MCHC RBC AUTO-ENTMCNC: 31.7 G/DL (ref 31–37)
MCV RBC AUTO: 89.8 FL (ref 74–97)
PLATELET # BLD AUTO: 290 K/UL (ref 135–420)
PMV BLD AUTO: 11.3 FL (ref 9.2–11.8)
POTASSIUM SERPL-SCNC: 4.2 MMOL/L (ref 3.5–5.5)
PROT SERPL-MCNC: 7.8 G/DL (ref 6.4–8.2)
RBC # BLD AUTO: 4.71 M/UL (ref 4.2–5.3)
SODIUM SERPL-SCNC: 139 MMOL/L (ref 136–145)
TRIGL SERPL-MCNC: 150 MG/DL (ref ?–150)
VLDLC SERPL CALC-MCNC: 30 MG/DL
WBC # BLD AUTO: 8.1 K/UL (ref 4.6–13.2)

## 2018-12-03 PROCEDURE — 80061 LIPID PANEL: CPT

## 2018-12-03 PROCEDURE — 82306 VITAMIN D 25 HYDROXY: CPT

## 2018-12-03 PROCEDURE — 83036 HEMOGLOBIN GLYCOSYLATED A1C: CPT

## 2018-12-03 PROCEDURE — 85027 COMPLETE CBC AUTOMATED: CPT

## 2018-12-03 PROCEDURE — 80053 COMPREHEN METABOLIC PANEL: CPT

## 2018-12-03 PROCEDURE — 36415 COLL VENOUS BLD VENIPUNCTURE: CPT

## 2018-12-03 RX ORDER — METFORMIN HYDROCHLORIDE 500 MG/1
500 TABLET, EXTENDED RELEASE ORAL 2 TIMES DAILY
Qty: 180 TAB | Refills: 1 | Status: SHIPPED | OUTPATIENT
Start: 2018-12-03 | End: 2019-05-02 | Stop reason: SDUPTHER

## 2018-12-03 RX ORDER — AMLODIPINE BESYLATE 5 MG/1
5 TABLET ORAL DAILY
Qty: 90 TAB | Refills: 1 | Status: SHIPPED | OUTPATIENT
Start: 2018-12-03 | End: 2019-05-02 | Stop reason: SDUPTHER

## 2018-12-03 RX ORDER — TELMISARTAN AND HYDROCHLORTHIAZIDE 80; 25 MG/1; MG/1
1 TABLET ORAL DAILY
Qty: 90 TAB | Refills: 1 | Status: SHIPPED | OUTPATIENT
Start: 2018-12-03 | End: 2019-05-02 | Stop reason: SDUPTHER

## 2018-12-03 RX ORDER — GLIMEPIRIDE 1 MG/1
1 TABLET ORAL
Qty: 90 TAB | Refills: 0 | Status: SHIPPED | OUTPATIENT
Start: 2018-12-03 | End: 2018-12-03 | Stop reason: SDUPTHER

## 2018-12-03 RX ORDER — GLIMEPIRIDE 1 MG/1
1 TABLET ORAL
Qty: 90 TAB | Refills: 0 | Status: SHIPPED | OUTPATIENT
Start: 2018-12-03 | End: 2019-05-02

## 2018-12-03 NOTE — ACP (ADVANCE CARE PLANNING)
Advance Care Planning (ACP) Provider Conversation Snapshot    Date of ACP Conversation: 12/03/18  Persons included in Conversation:  patient  Length of ACP Conversation in minutes:  <16 minutes (Non-Billable)    Authorized Decision Maker (if patient is incapable of making informed decisions):    This person is:   Kunal aguilar [phone]          For Patients with Decision Making Capacity:   pt to consider options    Conversation Outcomes / Follow-Up Plan:   Recommended completion of Advance Directive form after review of ACP materials and conversation with prospective healthcare agent

## 2018-12-03 NOTE — PATIENT INSTRUCTIONS
Medicare Wellness Visit, Female The best way to live healthy is to have a lifestyle where you eat a well-balanced diet, exercise regularly, limit alcohol use, and quit all forms of tobacco/nicotine, if applicable. Regular preventive services are another way to keep healthy. Preventive services (vaccines, screening tests, monitoring & exams) can help personalize your care plan, which helps you manage your own care. Screening tests can find health problems at the earliest stages, when they are easiest to treat. [facility] follows the current, evidence-based guidelines published by the [facility] ([facility]) when recommending preventive services for our patients. Because we follow these guidelines, sometimes recommendations change over time as research supports it. (For example, mammograms used to be recommended annually. Even though Medicare will still pay for an annual mammogram, the newer guidelines recommend a mammogram every two years for women of average risk.) Of course, you and your doctor may decide to screen more often for some diseases, based on your risk and your health status. Preventive services for you include: - Medicare offers their members a free annual wellness visit, which is time for you and your primary care provider to discuss and plan for your preventive service needs. Take advantage of this benefit every year! 
-All adults over the age of 72 should receive the recommended pneumonia vaccines. Current USPSTF guidelines recommend a series of two vaccines for the best pneumonia protection.  
-All adults should have a flu vaccine yearly and a tetanus vaccine every 10 years. All adults age 61 and older should receive a shingles vaccine once in their lifetime.   
-A bone mass density test is recommended when a woman turns 65 to screen for osteoporosis. This test is only recommended one time, as a screening. Some providers will use this same test as a disease monitoring tool if you already have osteoporosis. -All adults age 38-68 who are overweight should have a diabetes screening test once every three years.  
-Other screening tests and preventive services for persons with diabetes include: an eye exam to screen for diabetic retinopathy, a kidney function test, a foot exam, and stricter control over your cholesterol.  
-Cardiovascular screening for adults with routine risk involves an electrocardiogram (ECG) at intervals determined by your doctor.  
-Colorectal cancer screenings should be done for adults age 54-65 with no increased risk factors for colorectal cancer. There are a number of acceptable methods of screening for this type of cancer. Each test has its own benefits and drawbacks. Discuss with your doctor what is most appropriate for you during your annual wellness visit. The different tests include: colonoscopy (considered the best screening method), a fecal occult blood test, a fecal DNA test, and sigmoidoscopy. -Breast cancer screenings are recommended every other year for women of normal risk, age 54-69. 
-Cervical cancer screenings for women over age 72 are only recommended with certain risk factors.  
-All adults born between [facility] should be screened once for Hepatitis C. Here is a list of your current Health Maintenance items (your personalized list of preventive services) with a due date: 
Health Maintenance Due Topic Date Due  Shingles Vaccine (1 of 2) 05/27/2003 Weight loss: 
  
Watch portion sizes:  
1/2 your plate should be veggies for lunch and dinner. Carbohydrates should be no larger than the size of a tennis ball. Protein/meat should be the size of a deck of playing cards. Eat 3 meals/day Exercise - 150 minutes/week No beverages with calories Aim for losing 1lb/week Follow a 1200 calorie/day diet

## 2018-12-03 NOTE — PROGRESS NOTES
Assessment/Plan: 1. Type 2 diabetes mellitus without complication, without long-term current use of insulin (HCC) 
-ck labs. Sugars seem better with addition of amaryl. 
- HEMOGLOBIN A1C W/O EAG; Future 
- SITagliptin (JANUVIA) 100 mg tablet; take 1 tablet by mouth once daily  Dispense: 90 Tab; Refill: 1 
- metFORMIN ER (GLUCOPHAGE XR) 500 mg tablet; Take 1 Tab by mouth two (2) times a day. Dispense: 180 Tab; Refill: 1 
 
2. Initial Medicare annual wellness visit 3. Essential hypertension 
-cont current regimen 
- CBC W/O DIFF; Future - METABOLIC PANEL, COMPREHENSIVE; Future 
- amLODIPine (NORVASC) 5 mg tablet; Take 1 Tab by mouth daily. Dispense: 90 Tab; Refill: 1 
- telmisartan-hydroCHLOROthiazide (MICARDIS HCT) 80-25 mg per tablet; Take 1 Tab by mouth daily. Dispense: 90 Tab; Refill: 1 4. Pure hypercholesterolemia 
-cont current - LIPID PANEL; Future - METABOLIC PANEL, COMPREHENSIVE; Future 5. Class 1 obesity due to excess calories with serious comorbidity and body mass index (BMI) of 30.0 to 30.9 in adult 6. Vitamin D deficiency 
-not currently on supplement 
- VITAMIN D, 25 HYDROXY; Future The plan was discussed with the patient. The patient verbalized understanding and is in agreement with the plan. All medication potential side effects were discussed with the patient. Health Maintenance:  
Health Maintenance Topic Date Due  Shingrix Vaccine Age 50> (1 of 2) 05/27/2003  MEDICARE YEARLY EXAM  08/30/2018  HEMOGLOBIN A1C Q6M  03/04/2019  
 FOOT EXAM Q1  06/04/2019  MICROALBUMIN Q1  06/04/2019  LIPID PANEL Q1  06/04/2019  
 EYE EXAM RETINAL OR DILATED Q1  09/06/2019  BREAST CANCER SCRN MAMMOGRAM  01/17/2020  GLAUCOMA SCREENING Q2Y  09/06/2020  Pneumococcal 65+ Low/Medium Risk (2 of 2 - PPSV23) 04/28/2022  COLONOSCOPY  05/22/2024  
 DTaP/Tdap/Td series (2 - Td) 09/22/2025  Hepatitis C Screening  Completed  Bone Densitometry (Dexa) Screening  Completed  Influenza Age 5 to Adult  Completed Michael Christy is a 70 y.o. female and presents with Diabetes Subjective: 
DM2 - amaryl started last visit. No lows. bs running in the 100s. Feels good. ROS: 
Constitutional: No recent weight change. No weakness/fatigue. No f/c. Cardiovascular: No CP/palpitations. No MAGANA/orthopnea/PND. Respiratory: No cough/sputum, dyspnea, wheezing. Gastointestinal: No dysphagia, reflux. No n/v. No constipation/diarrhea. No melena/rectal bleeding. Psychiatric:  No depression, anxiety. The problem list was updated as a part of today's visit. Patient Active Problem List  
Diagnosis Code  Type 2 diabetes mellitus without complication (HCC) X89.3  
 Essential hypertension I10  
 H/O vitamin D deficiency Z86.39  
 Gastroesophageal reflux disease without esophagitis K21.9  Allergic rhinitis due to allergen J30.9  Macular degeneration, bilateral H35.30  Pure hypercholesterolemia E78.00  
 Hives L50.9  Breast nodule N63.0  
 Osteopenia of neck of right femur M85.851 The PSH, FH were reviewed. SH: Social History Tobacco Use  Smoking status: Never Smoker  Smokeless tobacco: Never Used Substance Use Topics  Alcohol use: No  
  Alcohol/week: 0.0 oz  Drug use: No  
 
 
Medications/Allergies: 
Current Outpatient Medications on File Prior to Visit Medication Sig Dispense Refill  glimepiride (AMARYL) 1 mg tablet Take 1 Tab by mouth every morning. 90 Tab 0  
 atorvastatin (LIPITOR) 40 mg tablet Take 1 Tab by mouth daily. 90 Tab 1  vit A/vit C/vit E/zinc/copper (ICAPS AREDS PO) Take  by mouth.  SITagliptin (JANUVIA) 100 mg tablet take 1 tablet by mouth once daily  Indications: type 2 diabetes mellitus 90 Tab 1  
 amLODIPine (NORVASC) 5 mg tablet Take 1 Tab by mouth daily.  Indications: hypertension 90 Tab 1  
  metFORMIN ER (GLUCOPHAGE XR) 500 mg tablet Take 1 Tab by mouth two (2) times a day. Indications: type 2 diabetes mellitus 180 Tab 1  
 telmisartan-hydroCHLOROthiazide (MICARDIS HCT) 80-25 mg per tablet Take 1 Tab by mouth daily. Indications: hypertension 90 Tab 1  
 ergocalciferol (VITAMIN D2) 50,000 unit capsule Take 1 Cap by mouth every seven (7) days. 12 Cap 3  
 aspirin delayed-release 81 mg tablet Take 1 Tab by mouth daily. 90 Tab 0  Blood-Glucose Meter monitoring kit Test glucose daily 1 Kit 0  
 glucose blood VI test strips (BLOOD GLUCOSE TEST) strip Test glucose daily 100 Strip 11  Lancets misc Test glucose daily 1 Each 11 No current facility-administered medications on file prior to visit. No Known Allergies Objective: 
Visit Vitals /80 (BP 1 Location: Left arm, BP Patient Position: Sitting) Pulse 93 Temp 98.5 °F (36.9 °C) (Oral) Resp 20 Ht 5' 3\" (1.6 m) Wt 174 lb (78.9 kg) SpO2 97% BMI 30.82 kg/m² Constitutional: Well developed, nourished, no distress, alert, obese habitus CV: S1, S2.  RRR. No murmurs/rubs. No thrills palpated. No carotid bruits. Intact distal pulses. No edema. No aortic bruits. Pulm: No abnormalities on inspection. Clear to auscultation bilaterally. No wheezing/rhonchi. Normal effort. This is an Initial Medicare Annual Wellness Exam (AWV) (Performed 12 months after IPPE or effective date of Medicare Part B enrollment, Once in a lifetime) I have reviewed the patient's medical history in detail and updated the computerized patient record. History Past Medical History:  
Diagnosis Date  Diabetes ([address]  Hypertension Past Surgical History:  
Procedure Laterality Date  HX HEENT Right 2014  
 cataract removal  
 HX ORTHOPAEDIC  1977 Lumbar Fussion Current Outpatient Medications Medication Sig Dispense Refill  glimepiride (AMARYL) 1 mg tablet Take 1 Tab by mouth every morning.  80 Tab 0  atorvastatin (LIPITOR) 40 mg tablet Take 1 Tab by mouth daily. 90 Tab 1  vit A/vit C/vit E/zinc/copper (ICAPS AREDS PO) Take  by mouth.  SITagliptin (JANUVIA) 100 mg tablet take 1 tablet by mouth once daily  Indications: type 2 diabetes mellitus 90 Tab 1  
 amLODIPine (NORVASC) 5 mg tablet Take 1 Tab by mouth daily. Indications: hypertension 90 Tab 1  
 metFORMIN ER (GLUCOPHAGE XR) 500 mg tablet Take 1 Tab by mouth two (2) times a day. Indications: type 2 diabetes mellitus 180 Tab 1  
 telmisartan-hydroCHLOROthiazide (MICARDIS HCT) 80-25 mg per tablet Take 1 Tab by mouth daily. Indications: hypertension 90 Tab 1  
 ergocalciferol (VITAMIN D2) 50,000 unit capsule Take 1 Cap by mouth every seven (7) days. 12 Cap 3  
 aspirin delayed-release 81 mg tablet Take 1 Tab by mouth daily. 90 Tab 0  Blood-Glucose Meter monitoring kit Test glucose daily 1 Kit 0  
 glucose blood VI test strips (BLOOD GLUCOSE TEST) strip Test glucose daily 100 Strip 11  Lancets misc Test glucose daily 1 Each 11 No Known Allergies Family History Problem Relation Age of Onset  Heart Disease Mother  No Known Problems Father  Diabetes Paternal Aunt Social History Tobacco Use  Smoking status: Never Smoker  Smokeless tobacco: Never Used Substance Use Topics  Alcohol use: No  
  Alcohol/week: 0.0 oz Patient Active Problem List  
Diagnosis Code  Type 2 diabetes mellitus without complication ([facility]) P32.1  
 Essential hypertension I10  
 H/O vitamin D deficiency Z86.39  
 Gastroesophageal reflux disease without esophagitis K21.9  Allergic rhinitis due to allergen J30.9  Macular degeneration, bilateral H35.30  Pure hypercholesterolemia E78.00  
 Hives L50.9  Breast nodule N63.0  
 Osteopenia of neck of right femur M85.851 Depression Risk Factor Screening: PHQ over the last two weeks 12/3/2018 Little interest or pleasure in doing things Not at all Feeling down, depressed, irritable, or hopeless Not at all Total Score PHQ 2 0 Alcohol Risk Factor Screening: You do not drink alcohol or very rarely. Functional Ability and Level of Safety:  
 
Hearing Loss Hearing is good. Activities of Daily Living The home contains: no safety equipment. Patient does total self care Fall Risk Fall Risk Assessment, last 12 mths 12/3/2018 Able to walk? Yes Fall in past 12 months? No  
 
 
Abuse Screen Patient is not abused Cognitive Screening Evaluation of Cognitive Function: 
Has your family/caregiver stated any concerns about your memory: no 
Normal 
 
Patient Care Team  
Patient Care Team: 
Evelio Hancock MD as PCP - General (Internal Medicine) Cornell Baker MD (Ophthalmology) Assessment/Plan Education and counseling provided: 
Are appropriate based on today's review and evaluation End-of-Life planning (with patient's consent) Diagnoses and all orders for this visit: 
 
1. Type 2 diabetes mellitus without complication, without long-term current use of insulin (HCC) 
-     HEMOGLOBIN A1C W/O EAG; Future 
-     SITagliptin (JANUVIA) 100 mg tablet; take 1 tablet by mouth once daily 
-     metFORMIN ER (GLUCOPHAGE XR) 500 mg tablet; Take 1 Tab by mouth two (2) times a day. 2. Initial Medicare annual wellness visit 3. Essential hypertension 
-     CBC W/O DIFF; Future -     METABOLIC PANEL, COMPREHENSIVE; Future 
-     amLODIPine (NORVASC) 5 mg tablet; Take 1 Tab by mouth daily. -     telmisartan-hydroCHLOROthiazide (MICARDIS HCT) 80-25 mg per tablet; Take 1 Tab by mouth daily. 4. Pure hypercholesterolemia -     LIPID PANEL; Future -     METABOLIC PANEL, COMPREHENSIVE; Future 5. Class 1 obesity due to excess calories with serious comorbidity and body mass index (BMI) of 30.0 to 30.9 in adult 6. Vitamin D deficiency 
-     VITAMIN D, 25 HYDROXY; Future Other orders -     glimepiride (AMARYL) 1 mg tablet; Take 1 Tab by mouth every morning. Health Maintenance Due Topic Date Due  Shingrix Vaccine Age 50> (1 of 2) 05/27/2003

## 2018-12-03 NOTE — PROGRESS NOTES
Ben Griffin is a 70 y.o. female (: 1955) presenting to address: Chief Complaint Patient presents with  Diabetes Mandeep Nguyen Medicare Wellness Vitals:  
 18 3970 BP: 120/80 Pulse: 93 Resp: 20 Temp: 98.5 °F (36.9 °C) TempSrc: Oral  
SpO2: 97% Weight: 174 lb (78.9 kg) Height: 5' 3\" (1.6 m) PainSc:   0 - No pain Vision: w/o correction Right:  Left:  Both:  Learning Assessment:  
 
Learning Assessment 2016 PRIMARY LEARNER Patient HIGHEST LEVEL OF EDUCATION - PRIMARY LEARNER  4 YEARS OF COLLEGE  
BARRIERS PRIMARY LEARNER NONE  
CO-LEARNER CAREGIVER No  
PRIMARY LANGUAGE ENGLISH  
LEARNER PREFERENCE PRIMARY LISTENING  
ANSWERED BY self RELATIONSHIP SELF Depression Screening: PHQ over the last two weeks 2018 Little interest or pleasure in doing things Not at all Feeling down, depressed, irritable, or hopeless Not at all Total Score PHQ 2 0 Fall Risk Assessment:  
 
Fall Risk Assessment, last 12 mths 2018 Able to walk? Yes Fall in past 12 months? No  
 
Abuse Screening:  
 
Abuse Screening Questionnaire 2018 Do you ever feel afraid of your partner? Zeke Whitman Are you in a relationship with someone who physically or mentally threatens you? Zeke Whitman Is it safe for you to go home? Yael Echavarria Coordination of Care Questionaire: 1. Have you been to the ER, urgent care clinic since your last visit? Hospitalized since your last visit? NO 
 
2. Have you seen or consulted any other health care providers outside of the [address] [place] [address] since your last visit? Include any pap smears or colon screening. NO Advanced Directive: 1. Do you have an Advanced Directive? YES 
 
2. Would you like information on Advanced Directives?  NO

## 2018-12-04 LAB — 25(OH)D3 SERPL-MCNC: 41 NG/ML (ref 30–100)

## 2019-02-26 DIAGNOSIS — E78.00 PURE HYPERCHOLESTEROLEMIA: ICD-10-CM

## 2019-02-26 RX ORDER — ATORVASTATIN CALCIUM 40 MG/1
TABLET, FILM COATED ORAL
Qty: 90 TAB | Refills: 1 | Status: SHIPPED | OUTPATIENT
Start: 2019-02-26 | End: 2019-05-02 | Stop reason: SDUPTHER

## 2019-05-02 ENCOUNTER — OFFICE VISIT (OUTPATIENT)
Dept: FAMILY MEDICINE CLINIC | Age: 64
End: 2019-05-02

## 2019-05-02 VITALS
SYSTOLIC BLOOD PRESSURE: 150 MMHG | HEIGHT: 63 IN | DIASTOLIC BLOOD PRESSURE: 92 MMHG | OXYGEN SATURATION: 99 % | WEIGHT: 175 LBS | BODY MASS INDEX: 31.01 KG/M2 | HEART RATE: 89 BPM | RESPIRATION RATE: 20 BRPM | TEMPERATURE: 98.4 F

## 2019-05-02 DIAGNOSIS — M25.562 MEDIAL KNEE PAIN, LEFT: ICD-10-CM

## 2019-05-02 DIAGNOSIS — I10 ESSENTIAL HYPERTENSION: ICD-10-CM

## 2019-05-02 DIAGNOSIS — E78.00 PURE HYPERCHOLESTEROLEMIA: ICD-10-CM

## 2019-05-02 DIAGNOSIS — E11.9 TYPE 2 DIABETES MELLITUS WITHOUT COMPLICATION, WITHOUT LONG-TERM CURRENT USE OF INSULIN (HCC): Primary | ICD-10-CM

## 2019-05-02 LAB
ALBUMIN UR QL STRIP: 30 MG/L
CREATININE, URINE POC: 300 MG/DL
HBA1C MFR BLD HPLC: 7.4 %
MICROALBUMIN/CREAT RATIO POC: <30 MG/G

## 2019-05-02 RX ORDER — ATORVASTATIN CALCIUM 40 MG/1
TABLET, FILM COATED ORAL
Qty: 90 TAB | Refills: 3 | Status: SHIPPED | OUTPATIENT
Start: 2019-05-02 | End: 2019-08-05 | Stop reason: SDUPTHER

## 2019-05-02 RX ORDER — TELMISARTAN AND HYDROCHLORTHIAZIDE 80; 25 MG/1; MG/1
1 TABLET ORAL DAILY
Qty: 90 TAB | Refills: 1 | Status: SHIPPED | OUTPATIENT
Start: 2019-05-02 | End: 2019-08-05 | Stop reason: SDUPTHER

## 2019-05-02 RX ORDER — NAPROXEN 500 MG/1
500 TABLET ORAL
Qty: 60 TAB | Refills: 0 | Status: SHIPPED | OUTPATIENT
Start: 2019-05-02 | End: 2020-02-03 | Stop reason: SDUPTHER

## 2019-05-02 RX ORDER — METFORMIN HYDROCHLORIDE 500 MG/1
500 TABLET, EXTENDED RELEASE ORAL 2 TIMES DAILY
Qty: 180 TAB | Refills: 0 | Status: SHIPPED | OUTPATIENT
Start: 2019-05-02 | End: 2019-07-29 | Stop reason: SDUPTHER

## 2019-05-02 RX ORDER — AMLODIPINE BESYLATE 5 MG/1
5 TABLET ORAL DAILY
Qty: 90 TAB | Refills: 1 | Status: SHIPPED | OUTPATIENT
Start: 2019-05-02 | End: 2019-08-05 | Stop reason: SDUPTHER

## 2019-05-02 RX ORDER — GLIMEPIRIDE 2 MG/1
2 TABLET ORAL
Qty: 90 TAB | Refills: 0 | Status: SHIPPED | OUTPATIENT
Start: 2019-05-02 | End: 2019-07-29 | Stop reason: SDUPTHER

## 2019-05-02 NOTE — PATIENT INSTRUCTIONS
Meniscus Tear: Exercises Your Care Instructions Here are some examples of typical rehabilitation exercises for your condition. Start each exercise slowly. Ease off the exercise if you start to have pain. Your doctor or physical therapist will tell you when you can start these exercises and which ones will work best for you. How to do the exercises Calf wall stretch 1. Stand facing a wall with your hands on the wall at about eye level. Put your affected leg about a step behind your other leg. 2. Keeping your back leg straight and your back heel on the floor, bend your front knee and gently bring your hip and chest toward the wall until you feel a stretch in the calf of your back leg. 3. Hold the stretch for at least 15 to 30 seconds. 4. Repeat 2 to 4 times. 5. Repeat steps 1 through 4, but this time keep your back knee bent. Hamstring wall stretch 1. Lie on your back in a doorway, with your good leg through the open door. 2. Slide your affected leg up the wall to straighten your knee. You should feel a gentle stretch down the back of your leg. 1. Do not arch your back. 2. Do not bend either knee. 3. Keep one heel touching the floor and the other heel touching the wall. Do not point your toes. 3. Hold the stretch for at least 1 minute. Then over time, try to lengthen the time you hold the stretch to as long as 6 minutes. 4. Repeat 2 to 4 times. 5. If you do not have a place to do this exercise in a doorway, there is another way to do it: 6. Lie on your back, and bend your affected leg. 7. Loop a towel under the ball and toes of that foot, and hold the ends of the towel in your hands. 8. Straighten your knee, and slowly pull back on the towel. You should feel a gentle stretch down the back of your leg. 9. Hold the stretch for at least 15 to 30 seconds. Or even better, hold the stretch for 1 minute if you can. 10. Repeat 2 to 4 times. [employer] 1. Sit with your leg straight and supported on the floor or a firm bed. (If you feel discomfort in the front or back of your knee, place a small towel roll under your knee.) 2. Tighten the muscles on top of your thigh by pressing the back of your knee flat down to the floor. (If you feel discomfort under your kneecap, place a small towel roll under your knee.) 3. Hold for about 6 seconds, then rest up to 10 seconds. 4. Do 8 to 12 repetitions several times a day. Straight-leg raises to the front 1. Lie on your back with your good knee bent so that your foot rests flat on the floor. Your injured leg should be straight. Make sure that your low back has a normal curve. You should be able to slip your flat hand in between the floor and the small of your back, with your palm touching the floor and your back touching the back of your hand. 2. Tighten the thigh muscles in the injured leg by pressing the back of your knee flat down to the floor. Hold your knee straight. 3. Keeping the thigh muscles tight, lift your injured leg up so that your heel is about 12 inches off the floor. Hold for 5 seconds and then lower slowly. 4. Do 8 to 12 repetitions. Straight-leg raises to the back 1. Lie on your stomach, and lift your leg straight up behind you (toward the ceiling). 2. Lift your toes about 6 inches off the floor, hold for about 6 seconds, then lower slowly. 3. Do 8 to 12 repetitions. Hamstring curls 1. Lie on your stomach with your knees straight. If your kneecap is uncomfortable, roll up a washcloth and put it under your leg just above your kneecap. 2. Lift the foot of your injured leg by bending the knee so that you bring the foot up toward your buttock. If this motion hurts, try it without bending your knee quite as far. This may help you avoid any painful motion. 3. Slowly lower your leg back to the floor. 4. Do 8 to 12 repetitions. 5. With permission from your doctor or physical therapist, you may also want to add a cuff weight to your ankle (not more than 5 pounds). With weight, you do not have to lift your leg more than 12 inches to get a hamstring workout. Wall slide with ball squeeze 1. Stand with your back against a wall and with your feet about shoulder-width apart. Your feet should be about 12 inches away from the wall. 2. Put a ball about the size of a soccer ball between your knees. Then slowly slide down the wall until your knees are bent about 20 to 30 degrees. 3. Tighten your thigh muscles by squeezing the ball between your knees. Hold that position for about 10 seconds, then stop squeezing. Rest for up to 10 seconds between repetitions. 4. Repeat 8 to 12 times. Heel raises 1. Stand with your feet a few inches apart, with your hands lightly resting on a counter or chair in front of you. 2. Slowly raise your heels off the floor while keeping your knees straight. 3. Hold for about 6 seconds, then slowly lower your heels to the floor. 4. Do 8 to 12 repetitions several times during the day. Heel dig bridging 1. Lie on your back with both knees bent and your ankles bent so that only your heels are digging into the floor. Your knees should be bent about 90 degrees. 2. Then push your heels into the floor, squeeze your buttocks, and lift your hips off the floor until your shoulders, hips, and knees are all in a straight line. 3. Hold for about 6 seconds as you continue to breathe normally, and then slowly lower your hips back down to the floor and rest for up to 10 seconds. 4. Do 8 to 12 repetitions. Shallow standing knee bends 1. Stand with your hands lightly resting on a counter or chair in front of you. Put your feet shoulder-width apart. 2. Slowly bend your knees so that you squat down like you are going to sit in a chair. Make sure your knees do not go in front of your toes. 3. Lower yourself about 6 inches. Your heels should remain on the floor at all times. 4. Rise slowly to a standing position. Follow-up care is a key part of your treatment and safety. Be sure to make and go to all appointments, and call your doctor if you are having problems. It's also a good idea to know your test results and keep a list of the medicines you take. Where can you learn more? Go to http://subha-elmer.info/. Enter C183 in the search box to learn more about \"Meniscus Tear: Exercises. \" Current as of: September 20, 2018 Content Version: 11.9 © 6157-4104 [employer]. Care instructions adapted under license by [employer] (which disclaims liability or warranty for this information). If you have questions about a medical condition or this instruction, always ask your healthcare professional. [place] [address] any warranty or liability for your use of this information. Meniscus Tear: Exercises Your Care Instructions Here are some examples of typical rehabilitation exercises for your condition. Start each exercise slowly. Ease off the exercise if you start to have pain. Your doctor or physical therapist will tell you when you can start these exercises and which ones will work best for you. How to do the exercises Calf wall stretch 6. Stand facing a wall with your hands on the wall at about eye level. Put your affected leg about a step behind your other leg. 7. Keeping your back leg straight and your back heel on the floor, bend your front knee and gently bring your hip and chest toward the wall until you feel a stretch in the calf of your back leg. 8. Hold the stretch for at least 15 to 30 seconds. 9. Repeat 2 to 4 times. 10. Repeat steps 1 through 4, but this time keep your back knee bent. Hamstring wall stretch 11. Lie on your back in a doorway, with your good leg through the open door. 12. Slide your affected leg up the wall to straighten your knee. You should feel a gentle stretch down the back of your leg. 1. Do not arch your back. 2. Do not bend either knee. 3. Keep one heel touching the floor and the other heel touching the wall. Do not point your toes. 13. Hold the stretch for at least 1 minute. Then over time, try to lengthen the time you hold the stretch to as long as 6 minutes. 14. Repeat 2 to 4 times. 15. If you do not have a place to do this exercise in a doorway, there is another way to do it: 
16. Lie on your back, and bend your affected leg. 17. Loop a towel under the ball and toes of that foot, and hold the ends of the towel in your hands. 18. Straighten your knee, and slowly pull back on the towel. You should feel a gentle stretch down the back of your leg. 19. Hold the stretch for at least 15 to 30 seconds. Or even better, hold the stretch for 1 minute if you can. 20. Repeat 2 to 4 times. [email or web address] 5. Sit with your leg straight and supported on the floor or a firm bed. (If you feel discomfort in the front or back of your knee, place a small towel roll under your knee.) 6. Tighten the muscles on top of your thigh by pressing the back of your knee flat down to the floor. (If you feel discomfort under your kneecap, place a small towel roll under your knee.) 7. Hold for about 6 seconds, then rest up to 10 seconds. 8. Do 8 to 12 repetitions several times a day. Straight-leg raises to the front 5. Lie on your back with your good knee bent so that your foot rests flat on the floor. Your injured leg should be straight. Make sure that your low back has a normal curve. You should be able to slip your flat hand in between the floor and the small of your back, with your palm touching the floor and your back touching the back of your hand. 6. Tighten the thigh muscles in the injured leg by pressing the back of your knee flat down to the floor. Hold your knee straight. 7. Keeping the thigh muscles tight, lift your injured leg up so that your heel is about 12 inches off the floor. Hold for 5 seconds and then lower slowly. 8. Do 8 to 12 repetitions. Straight-leg raises to the back 4. Lie on your stomach, and lift your leg straight up behind you (toward the ceiling). 5. Lift your toes about 6 inches off the floor, hold for about 6 seconds, then lower slowly. 6. Do 8 to 12 repetitions. Hamstring curls 6. Lie on your stomach with your knees straight. If your kneecap is uncomfortable, roll up a washcloth and put it under your leg just above your kneecap. 7. Lift the foot of your injured leg by bending the knee so that you bring the foot up toward your buttock. If this motion hurts, try it without bending your knee quite as far. This may help you avoid any painful motion. 8. Slowly lower your leg back to the floor. 9. Do 8 to 12 repetitions. 10. With permission from your doctor or physical therapist, you may also want to add a cuff weight to your ankle (not more than 5 pounds). With weight, you do not have to lift your leg more than 12 inches to get a hamstring workout. Wall slide with ball squeeze 5. Stand with your back against a wall and with your feet about shoulder-width apart. Your feet should be about 12 inches away from the wall. 6. Put a ball about the size of a soccer ball between your knees. Then slowly slide down the wall until your knees are bent about 20 to 30 degrees. 7. Tighten your thigh muscles by squeezing the ball between your knees. Hold that position for about 10 seconds, then stop squeezing. Rest for up to 10 seconds between repetitions. 8. Repeat 8 to 12 times. Heel raises 5. Stand with your feet a few inches apart, with your hands lightly resting on a counter or chair in front of you. 6. Slowly raise your heels off the floor while keeping your knees straight. 7. Hold for about 6 seconds, then slowly lower your heels to the floor. 8. Do 8 to 12 repetitions several times during the day. Heel dig bridging 5. Lie on your back with both knees bent and your ankles bent so that only your heels are digging into the floor. Your knees should be bent about 90 degrees. 6. Then push your heels into the floor, squeeze your buttocks, and lift your hips off the floor until your shoulders, hips, and knees are all in a straight line. 7. Hold for about 6 seconds as you continue to breathe normally, and then slowly lower your hips back down to the floor and rest for up to 10 seconds. 8. Do 8 to 12 repetitions. Shallow standing knee bends 5. Stand with your hands lightly resting on a counter or chair in front of you. Put your feet shoulder-width apart. 6. Slowly bend your knees so that you squat down like you are going to sit in a chair. Make sure your knees do not go in front of your toes. 7. Lower yourself about 6 inches. Your heels should remain on the floor at all times. 8. Rise slowly to a standing position. Follow-up care is a key part of your treatment and safety. Be sure to make and go to all appointments, and call your doctor if you are having problems. It's also a good idea to know your test results and keep a list of the medicines you take. Where can you learn more? Go to http://subha-elmer.info/. Enter C183 in the search box to learn more about \"Meniscus Tear: Exercises. \" Current as of: September 20, 2018 Content Version: 11.9 © 9115-0456 [employer]. Care instructions adapted under license by [employer] (which disclaims liability or warranty for this information). If you have questions about a medical condition or this instruction, always ask your healthcare professional. [address] any warranty or liability for your use of this information.

## 2019-05-02 NOTE — PROGRESS NOTES
Assessment/Plan: 1. Type 2 diabetes mellitus without complication, without long-term current use of insulin ([address] -a1c 7.4. incr amaryl to 2mg. F/u in 3 mos. - AMB POC HEMOGLOBIN A1C 
-  DIABETES FOOT EXAM 
- AMB POC URINE, MICROALBUMIN, SEMIQUANT (3 RESULTS) 
- glimepiride (AMARYL) 2 mg tablet; Take 1 Tab by mouth every morning. Dispense: 90 Tab; Refill: 0 
- SITagliptin (JANUVIA) 100 mg tablet; take 1 tablet by mouth once daily  Indications: type 2 diabetes mellitus  Dispense: 90 Tab; Refill: 0 
- metFORMIN ER (GLUCOPHAGE XR) 500 mg tablet; Take 1 Tab by mouth two (2) times a day. Indications: type 2 diabetes mellitus  Dispense: 180 Tab; Refill: 0 
 
2. Pure hypercholesterolemia 
-refilled 
- atorvastatin (LIPITOR) 40 mg tablet; take 1 tablet by mouth once daily  Dispense: 90 Tab; Refill: 3 3. Essential hypertension 
-normally better controlled. F/u in 3 mos. - amLODIPine (NORVASC) 5 mg tablet; Take 1 Tab by mouth daily. Indications: high blood pressure  Dispense: 90 Tab; Refill: 1 
- telmisartan-hydroCHLOROthiazide (MICARDIS HCT) 80-25 mg per tablet; Take 1 Tab by mouth daily. Indications: high blood pressure  Dispense: 90 Tab; Refill: 1 
 
4. Medial knee pain, left 
-suspect meniscus tear. Nsaids. Home exercises. If not improved in 3 weeks, will refer to PT and do xray. - naproxen (NAPROSYN) 500 mg tablet; Take 1 Tab by mouth two (2) times daily as needed for Pain. Dispense: 60 Tab; Refill: 0 The plan was discussed with the patient. The patient verbalized understanding and is in agreement with the plan. All medication potential side effects were discussed with the patient. Health Maintenance:  
Health Maintenance Topic Date Due  Shingrix Vaccine Age 50> (1 of 2) 05/27/2003  HEMOGLOBIN A1C Q6M  06/03/2019  MICROALBUMIN Q1  06/04/2019  Influenza Age 5 to Adult  08/01/2019  LIPID PANEL Q1  12/03/2019  MEDICARE YEARLY EXAM  12/04/2019  BREAST CANCER SCRN MAMMOGRAM  01/17/2020  
 FOOT EXAM Q1  05/02/2020  GLAUCOMA SCREENING Q2Y  09/06/2020  
 EYE EXAM RETINAL OR DILATED  09/06/2020  Pneumococcal 65+ years (2 of 2 - PPSV23) 04/28/2022  COLONOSCOPY  05/22/2024  
 DTaP/Tdap/Td series (2 - Td) 09/22/2025  Hepatitis C Screening  Completed  Bone Densitometry (Dexa) Screening  Completed Rhetta Fothergill is a 70 y.o. female and presents with Knee Pain (Left/Swelling) Subjective: 
DM2 - a1c 7.4. Compliant with meds. HTN - bp elevated. Normally well controlled. Pt notes L medial knee swelling x 3 weeks. Has pain. She was walking and felt a pop in her knee. Has taken aleve w/o relief. No pain at rest, only with walking/weight bearing. ROS: 
Constitutional: No recent weight change. No weakness/fatigue. No f/c. Cardiovascular: No CP/palpitations. No MAGANA/orthopnea/PND. Respiratory: No cough/sputum, dyspnea, wheezing. Musculoskeletal: + joint pain/no stiffness. No muscle pain/tenderness. The problem list was updated as a part of today's visit. Patient Active Problem List  
Diagnosis Code  Type 2 diabetes mellitus without complication (HCC) Z98.8  
 Essential hypertension I10  
 H/O vitamin D deficiency Z86.39  
 Gastroesophageal reflux disease without esophagitis K21.9  Allergic rhinitis due to allergen J30.9  Macular degeneration, bilateral H35.30  Pure hypercholesterolemia E78.00  
 Hives L50.9  Breast nodule N63.0  
 Osteopenia of neck of right femur M85.851 The PSH, FH were reviewed. SH: Social History Tobacco Use  Smoking status: Never Smoker  Smokeless tobacco: Never Used Substance Use Topics  Alcohol use: No  
  Alcohol/week: 0.0 oz  Drug use: No  
 
 
Medications/Allergies: 
Current Outpatient Medications on File Prior to Visit Medication Sig Dispense Refill  amLODIPine (NORVASC) 5 mg tablet Take 1 Tab by mouth daily.  90 Tab 1  
  atorvastatin (LIPITOR) 40 mg tablet take 1 tablet by mouth once daily 90 Tab 1  
 SITagliptin (JANUVIA) 100 mg tablet take 1 tablet by mouth once daily 90 Tab 1  
 metFORMIN ER (GLUCOPHAGE XR) 500 mg tablet Take 1 Tab by mouth two (2) times a day. 180 Tab 1  
 telmisartan-hydroCHLOROthiazide (MICARDIS HCT) 80-25 mg per tablet Take 1 Tab by mouth daily. 90 Tab 1  
 glimepiride (AMARYL) 1 mg tablet Take 1 Tab by mouth every morning. 90 Tab 0  
 vit A/vit C/vit E/zinc/copper (ICAPS AREDS PO) Take  by mouth.  aspirin delayed-release 81 mg tablet Take 1 Tab by mouth daily. 90 Tab 0  
 glucose blood VI test strips (BLOOD GLUCOSE TEST) strip Test glucose daily 100 Strip 11  Lancets misc Test glucose daily 1 Each 11 No current facility-administered medications on file prior to visit. Allergies Allergen Reactions  Aspirin Other (comments) Abdominal pain Objective: 
Visit Vitals BP (!) 150/92 (BP 1 Location: Left arm, BP Patient Position: Sitting) Pulse 89 Temp 98.4 °F (36.9 °C) (Oral) Resp 20 Ht 5' 3\" (1.6 m) Wt 175 lb (79.4 kg) SpO2 99% BMI 31.00 kg/m² Constitutional: Well developed, nourished, no distress, alert, obese habitus CV: S1, S2.  RRR. No murmurs/rubs. Pulm: No abnormalities on inspection. Clear to auscultation bilaterally. No wheezing/rhonchi. Normal effort. MS: Gait normal.  +pain over L medial knee joint line with small overlying effusion. +thessaly. No crepitus. No lateral joint line tenderness. Monofilament nml bilat

## 2019-05-02 NOTE — PROGRESS NOTES
Dione Rodriguez is a 70 y.o. female (: 1955) presenting to address: Chief Complaint Patient presents with  Knee Pain Left/Swelling Vitals:  
 19 0902 BP: (!) 150/92 Pulse: 89 Resp: 20 Temp: 98.4 °F (36.9 °C) TempSrc: Oral  
SpO2: 99% Weight: 175 lb (79.4 kg) Height: 5' 3\" (1.6 m) PainSc:   9 PainLoc: Knee Learning Assessment:  
 
Learning Assessment 2016 PRIMARY LEARNER Patient HIGHEST LEVEL OF EDUCATION - PRIMARY LEARNER  4 YEARS OF COLLEGE  
BARRIERS PRIMARY LEARNER NONE  
CO-LEARNER CAREGIVER No  
PRIMARY LANGUAGE ENGLISH  
LEARNER PREFERENCE PRIMARY LISTENING  
ANSWERED BY self RELATIONSHIP SELF Depression Screening:  
 
3 most recent PHQ Screens 2019 Little interest or pleasure in doing things Not at all Feeling down, depressed, irritable, or hopeless Not at all Total Score PHQ 2 0 Fall Risk Assessment:  
 
Fall Risk Assessment, last 12 mths 2019 Able to walk? Yes Fall in past 12 months? No  
 
Abuse Screening:  
 
Abuse Screening Questionnaire 2019 Do you ever feel afraid of your partner? Venus Willy Are you in a relationship with someone who physically or mentally threatens you? Venus Willy Is it safe for you to go home? Gomez Bennett Coordination of Care Questionaire: 1. Have you been to the ER, urgent care clinic since your last visit? Hospitalized since your last visit? NO 
 
2. Have you seen or consulted any other health care providers outside of the [address] [place] [address] since your last visit? Include any pap smears or colon screening. NO Advanced Directive: 1. Do you have an Advanced Directive? YES 
 
2. Would you like information on Advanced Directives?  NO

## 2019-07-17 NOTE — MR AVS SNAPSHOT
[address] [place] [address] Ne 
 
 
 [address] [facility] [address] [phone] Patient:  Shoulder MRN: [id number] Visit Information Date & Time Provider Department Dept. Phone Encounter #  
 8/28/2018  8:30 AM Marie Thompson, 3 [facility] [phone] [id number] Your Appointments 12/3/2018  8:00 AM  
Follow Up with Marie Thompson MD  
3 [facility] Appt Note: f/u appointment needed [address] [facility] [address] [phone]  
  
   
 [address] Upcoming Health Maintenance Date Due Bone Densitometry (Dexa) Screening 5/27/2018 Pneumococcal 65+ Low/Medium Risk (1 of 2 - PCV13) 5/27/2018 Influenza Age 5 to Adult 8/1/2018 EYE EXAM RETINAL OR DILATED Q1 9/7/2018 HEMOGLOBIN A1C Q6M 12/4/2018 FOOT EXAM Q1 6/4/2019 MICROALBUMIN Q1 6/4/2019 LIPID PANEL Q1 6/4/2019 GLAUCOMA SCREENING Q2Y 9/7/2019 BREAST CANCER SCRN MAMMOGRAM 1/17/2020 COLONOSCOPY 5/22/2024 DTaP/Tdap/Td series (2 - Td) 9/22/2025 Allergies as of 8/28/2018  Review Complete On: 8/28/2018 By: Fatimah Benítez No Known Allergies Current Immunizations  Reviewed on 9/22/2015 Name Date Pneumococcal Polysaccharide (PPSV-23) 4/28/2017  8:41 AM  
 Tdap 9/22/2015  8:04 AM  
  
 Not reviewed this visit You Were Diagnosed With   
  
 Codes Comments Type 2 diabetes mellitus without complication, without long-term current use of insulin (HCC)    -  Primary ICD-10-CM: E11.9 ICD-9-CM: 250.00 Pure hypercholesterolemia     ICD-10-CM: E78.00 ICD-9-CM: 272.0 Breast nodule     ICD-10-CM: N63.0 ICD-9-CM: 793.89 Vitals BP Pulse Temp Resp Height(growth percentile) Weight(growth percentile)  132/80 (BP 1 Location: Left arm, BP Patient Position: Sitting) (!) 102 Low platelets which I know you are aware of, otherwise labs and ekg looked ok  98.4 °F (36.9 °C) (Oral) 20 5' 3\" (1.6 m) 173 lb (78.5 kg) SpO2 BMI OB Status Smoking Status 98% 30.65 kg/m2 Menopause Never Smoker Vitals History BMI and BSA Data Body Mass Index Body Surface Area  
 30.65 kg/m 2 1.87 m 2 Preferred Pharmacy [facility] Name Phone [id number] [address] [place] [address] [phone] Your Updated Medication List  
  
   
This list is accurate as of 8/28/18  8:44 AM.  Always use your most recent med list. amLODIPine 5 mg tablet Commonly known as:  Johana Colon Take 1 Tab by mouth daily. Indications: hypertension  
  
 aspirin delayed-release 81 mg tablet Take 1 Tab by mouth daily. atorvastatin 40 mg tablet Commonly known as:  LIPITOR Take 1 Tab by mouth daily. Blood-Glucose Meter monitoring kit Test glucose daily  
  
 ergocalciferol 50,000 unit capsule Commonly known as:  VITAMIN D2 Take 1 Cap by mouth every seven (7) days. glucose blood VI test strips strip Commonly known as:  blood glucose test  
Test glucose daily ICAPS AREDS PO Take  by mouth. Lancets Misc Test glucose daily  
  
 metFORMIN  mg tablet Commonly known as:  GLUCOPHAGE XR Take 1 Tab by mouth two (2) times a day. Indications: type 2 diabetes mellitus SITagliptin 100 mg tablet Commonly known as:  Juluis Marks  
take 1 tablet by mouth once daily  Indications: type 2 diabetes mellitus  
  
 telmisartan-hydroCHLOROthiazide 80-25 mg per tablet Commonly known as:  MICARDIS HCT Take 1 Tab by mouth daily. Indications: hypertension Prescriptions Sent to Pharmacy Refills  
 atorvastatin (LIPITOR) 40 mg tablet 1 Sig: Take 1 Tab by mouth daily. Class: Normal  
 [facility]acy: [facility] [address] Ph #: [phone] Route: Oral  
  
To-Do List   
 08/28/2018 Lab:  HEMOGLOBIN A1C W/O EAG   
  
 09/11/2018 9:30 AM  
  Appointment with [facility] BONE DENSITY RM 1 at [address] ([phone]) OUTSIDE FILMS  - Any outside films related to the study being scheduled should be brought with you on the day of the exam.  If this cannot be done there may be a delay in the reading of the study. MEDICATIONS  - Patient must bring a complete list of all medications currently taking to include prescriptions, over-the-counter meds, herbals, vitamins & any dietary supplements - Patient must discontinue use of calcium, vitamins, or calcium supplements including antacids (calcium based) 24 hours before scan. CHECK IN INSTRUCTIONS  For DEXA/Bone Density Studies:  Check in to the [facility] Desk 15 minutes prior to your appointment. [address], [facility] Introducing [facility] [place] [facility]! Ricardo Ahmadi introduces [employer] patient portal. Now you can access parts of your medical record, email your doctor's office, and request medication refills online. 1. In your internet browser, go to [email or web address] 2. Click on the First Time User? Click Here link in the Sign In box. You will see the New Member Sign Up page. 3. Enter your [employer] Access Code exactly as it appears below. You will not need to use this code after youve completed the sign-up process. If you do not sign up before the expiration date, you must request a new code. · [employer] Access Code: [email or web address] Expires: 9/2/2018  7:57 AM 
 
4. Enter the last four digits of your Social Security Number (xxxx) and Date of Birth (mm/dd/yyyy) as indicated and click Submit. You will be taken to the next sign-up page. 5. Create a [facility] ID. This will be your [employer] login ID and cannot be changed, so think of one that is secure and easy to remember. 6. Create a [facility] password. You can change your password at any time. 7. Enter your Password Reset Question and Answer. This can be used at a later time if you forget your password. 8. Enter your e-mail address. You will receive e-mail notification when new information is available in [address] 9. Click Sign Up. You can now view and download portions of your medical record. 10. Click the Download Summary menu link to download a portable copy of your medical information. If you have questions, please visit the Frequently Asked Questions section of the [employer] website. Remember, [employer] is NOT to be used for urgent needs. For medical emergencies, dial 911. Now available from your iPhone and Android! Please provide this summary of care documentation to your next provider. Your primary care clinician is listed as Lilibeth 13. If you have any questions after today's visit, please call [phone].

## 2019-07-29 DIAGNOSIS — E11.9 TYPE 2 DIABETES MELLITUS WITHOUT COMPLICATION, WITHOUT LONG-TERM CURRENT USE OF INSULIN (HCC): ICD-10-CM

## 2019-07-29 RX ORDER — GLIMEPIRIDE 2 MG/1
TABLET ORAL
Qty: 90 TAB | Refills: 0 | Status: SHIPPED | OUTPATIENT
Start: 2019-07-29 | End: 2019-08-05 | Stop reason: SDUPTHER

## 2019-07-29 RX ORDER — METFORMIN HYDROCHLORIDE 500 MG/1
TABLET, EXTENDED RELEASE ORAL
Qty: 180 TAB | Refills: 0 | Status: SHIPPED | OUTPATIENT
Start: 2019-07-29 | End: 2019-08-05 | Stop reason: SDUPTHER

## 2019-07-31 DIAGNOSIS — E11.9 TYPE 2 DIABETES MELLITUS WITHOUT COMPLICATION, WITHOUT LONG-TERM CURRENT USE OF INSULIN (HCC): ICD-10-CM

## 2019-08-05 ENCOUNTER — HOSPITAL ENCOUNTER (OUTPATIENT)
Dept: LAB | Age: 64
Discharge: HOME | End: 2019-08-05
Payer: MEDICARE

## 2019-08-05 ENCOUNTER — OFFICE VISIT (OUTPATIENT)
Dept: FAMILY MEDICINE CLINIC | Age: 64
End: 2019-08-05

## 2019-08-05 VITALS
WEIGHT: 173 LBS | HEIGHT: 63 IN | SYSTOLIC BLOOD PRESSURE: 139 MMHG | HEART RATE: 103 BPM | BODY MASS INDEX: 30.65 KG/M2 | DIASTOLIC BLOOD PRESSURE: 75 MMHG | OXYGEN SATURATION: 97 % | RESPIRATION RATE: 20 BRPM | TEMPERATURE: 98.2 F

## 2019-08-05 DIAGNOSIS — Z00.00 WELCOME TO MEDICARE PREVENTIVE VISIT: Primary | ICD-10-CM

## 2019-08-05 DIAGNOSIS — I10 ESSENTIAL HYPERTENSION: ICD-10-CM

## 2019-08-05 DIAGNOSIS — E11.9 TYPE 2 DIABETES MELLITUS WITHOUT COMPLICATION, WITHOUT LONG-TERM CURRENT USE OF INSULIN (HCC): ICD-10-CM

## 2019-08-05 DIAGNOSIS — E78.00 PURE HYPERCHOLESTEROLEMIA: ICD-10-CM

## 2019-08-05 DIAGNOSIS — E66.09 CLASS 1 OBESITY DUE TO EXCESS CALORIES WITH SERIOUS COMORBIDITY AND BODY MASS INDEX (BMI) OF 30.0 TO 30.9 IN ADULT: ICD-10-CM

## 2019-08-05 LAB
ALBUMIN SERPL-MCNC: 4.5 G/DL (ref 3.4–5)
ALBUMIN/GLOB SERPL: 1.4 {RATIO} (ref 0.8–1.7)
ALP SERPL-CCNC: 101 U/L (ref 45–117)
ALT SERPL-CCNC: 21 U/L (ref 13–56)
ANION GAP SERPL CALC-SCNC: 6 MMOL/L (ref 3–18)
AST SERPL-CCNC: 16 U/L (ref 10–38)
BILIRUB SERPL-MCNC: 0.7 MG/DL (ref 0.2–1)
BUN SERPL-MCNC: 24 MG/DL (ref 7–18)
BUN/CREAT SERPL: 24 (ref 12–20)
CALCIUM SERPL-MCNC: 9.3 MG/DL (ref 8.5–10.1)
CHLORIDE SERPL-SCNC: 101 MMOL/L (ref 100–111)
CHOLEST SERPL-MCNC: 144 MG/DL
CO2 SERPL-SCNC: 30 MMOL/L (ref 21–32)
CREAT SERPL-MCNC: 0.99 MG/DL (ref 0.6–1.3)
ERYTHROCYTE [DISTWIDTH] IN BLOOD BY AUTOMATED COUNT: 13.8 % (ref 11.6–14.5)
GLOBULIN SER CALC-MCNC: 3.2 G/DL (ref 2–4)
GLUCOSE SERPL-MCNC: 137 MG/DL (ref 74–99)
HBA1C MFR BLD HPLC: 6.7 %
HCT VFR BLD AUTO: 40.7 % (ref 35–45)
HDLC SERPL-MCNC: 46 MG/DL (ref 40–60)
HDLC SERPL: 3.1 {RATIO} (ref 0–5)
HGB BLD-MCNC: 13 G/DL (ref 12–16)
LDLC SERPL CALC-MCNC: 73.8 MG/DL (ref 0–100)
LIPID PROFILE,FLP: NORMAL
MCH RBC QN AUTO: 27.8 PG (ref 24–34)
MCHC RBC AUTO-ENTMCNC: 31.9 G/DL (ref 31–37)
MCV RBC AUTO: 87 FL (ref 74–97)
PLATELET # BLD AUTO: 307 K/UL (ref 135–420)
PMV BLD AUTO: 12 FL (ref 9.2–11.8)
POTASSIUM SERPL-SCNC: 4.1 MMOL/L (ref 3.5–5.5)
PROT SERPL-MCNC: 7.7 G/DL (ref 6.4–8.2)
RBC # BLD AUTO: 4.68 M/UL (ref 4.2–5.3)
SODIUM SERPL-SCNC: 137 MMOL/L (ref 136–145)
TRIGL SERPL-MCNC: 121 MG/DL (ref ?–150)
VLDLC SERPL CALC-MCNC: 24.2 MG/DL
WBC # BLD AUTO: 8.2 K/UL (ref 4.6–13.2)

## 2019-08-05 PROCEDURE — 85027 COMPLETE CBC AUTOMATED: CPT

## 2019-08-05 PROCEDURE — 36415 COLL VENOUS BLD VENIPUNCTURE: CPT

## 2019-08-05 PROCEDURE — 80061 LIPID PANEL: CPT

## 2019-08-05 PROCEDURE — 80053 COMPREHEN METABOLIC PANEL: CPT

## 2019-08-05 RX ORDER — TELMISARTAN AND HYDROCHLORTHIAZIDE 80; 25 MG/1; MG/1
1 TABLET ORAL DAILY
Qty: 90 TAB | Refills: 1 | Status: SHIPPED | OUTPATIENT
Start: 2019-08-05 | End: 2020-02-03 | Stop reason: SDUPTHER

## 2019-08-05 RX ORDER — AMLODIPINE BESYLATE 5 MG/1
5 TABLET ORAL DAILY
Qty: 90 TAB | Refills: 1 | Status: SHIPPED | OUTPATIENT
Start: 2019-08-05 | End: 2020-02-03 | Stop reason: SDUPTHER

## 2019-08-05 RX ORDER — ATORVASTATIN CALCIUM 40 MG/1
TABLET, FILM COATED ORAL
Qty: 90 TAB | Refills: 3 | Status: SHIPPED | OUTPATIENT
Start: 2019-08-05 | End: 2020-02-03 | Stop reason: SDUPTHER

## 2019-08-05 RX ORDER — METFORMIN HYDROCHLORIDE 500 MG/1
TABLET, EXTENDED RELEASE ORAL
Qty: 180 TAB | Refills: 1 | Status: SHIPPED | OUTPATIENT
Start: 2019-08-05 | End: 2020-02-03 | Stop reason: SDUPTHER

## 2019-08-05 RX ORDER — GLIMEPIRIDE 2 MG/1
TABLET ORAL
Qty: 90 TAB | Refills: 1 | Status: SHIPPED | OUTPATIENT
Start: 2019-08-05 | End: 2020-04-28

## 2019-08-05 NOTE — PROGRESS NOTES
Nishi Osman is a 75 y.o. female (: 1955) presenting to address:    Chief Complaint   Patient presents with    Annual Wellness Visit       Vitals:    19 0820   BP: 139/75   Pulse: (!) 103   Resp: 20   Temp: 98.2 °F (36.8 °C)   TempSrc: Oral   SpO2: 97%   Weight: 173 lb (78.5 kg)   Height: 5' 3\" (1.6 m)   PainSc:   5   PainLoc: Knee       Hearing/Vision:      Visual Acuity Screening    Right eye Left eye Both eyes   Without correction: 20/50 20/25 20/25   With correction:          Learning Assessment:     Learning Assessment 2016   PRIMARY LEARNER Patient   HIGHEST LEVEL OF EDUCATION - PRIMARY LEARNER  4 YEARS OF COLLEGE   BARRIERS PRIMARY LEARNER NONE   CO-LEARNER CAREGIVER No   PRIMARY LANGUAGE ENGLISH   LEARNER PREFERENCE PRIMARY LISTENING   ANSWERED BY self   RELATIONSHIP SELF     Depression Screening:     3 most recent PHQ Screens 2019   Little interest or pleasure in doing things Not at all   Feeling down, depressed, irritable, or hopeless Not at all   Total Score PHQ 2 0     Fall Risk Assessment:     Fall Risk Assessment, last 12 mths 2019   Able to walk? Yes   Fall in past 12 months? No     Abuse Screening:     Abuse Screening Questionnaire 2019   Do you ever feel afraid of your partner? N   Are you in a relationship with someone who physically or mentally threatens you? N   Is it safe for you to go home? Y     Coordination of Care Questionaire:   1. Have you been to the ER, urgent care clinic since your last visit? Hospitalized since your last visit? NO    2. Have you seen or consulted any other health care providers outside of the [address] [place] [address] since your last visit? Include any pap smears or colon screening. NO    Advanced Directive:   1. Do you have an Advanced Directive? YES    2. Would you like information on Advanced Directives?  NO

## 2019-08-05 NOTE — PROGRESS NOTES
This is a \"Welcome to [employer]"  Initial Preventive Physical Examination (IPPE) providing Personalized Prevention Plan Services (Performed in the first 12 months of enrollment)    I have reviewed the patient's medical history in detail and updated the computerized patient record. History     Past Medical History:   Diagnosis Date    Diabetes ([address]     Hypertension       Past Surgical History:   Procedure Laterality Date    HX HEENT Right 2014    cataract removal    HX ORTHOPAEDIC  1977     Lumbar Fussion     Current Outpatient Medications   Medication Sig Dispense Refill    SITagliptin (JANUVIA) 100 mg tablet take 1 tablet by mouth once daily for TYPE 2 DIABETES MELLITUS 90 Tab 0    glimepiride (AMARYL) 2 mg tablet take 1 tablet by mouth every morning 90 Tab 0    metFORMIN ER (GLUCOPHAGE XR) 500 mg tablet take 1 tablet by mouth twice a day for TYPE 2 DIABETES MELLITUS 180 Tab 0    atorvastatin (LIPITOR) 40 mg tablet take 1 tablet by mouth once daily 90 Tab 3    amLODIPine (NORVASC) 5 mg tablet Take 1 Tab by mouth daily. Indications: high blood pressure 90 Tab 1    telmisartan-hydroCHLOROthiazide (MICARDIS HCT) 80-25 mg per tablet Take 1 Tab by mouth daily. Indications: high blood pressure 90 Tab 1    naproxen (NAPROSYN) 500 mg tablet Take 1 Tab by mouth two (2) times daily as needed for Pain. 60 Tab 0    vit A/vit C/vit E/zinc/copper (ICAPS AREDS PO) Take  by mouth.  glucose blood VI test strips (BLOOD GLUCOSE TEST) strip Test glucose daily 100 Strip 11    Lancets misc Test glucose daily 1 Each 11    aspirin delayed-release 81 mg tablet Take 1 Tab by mouth daily.  90 Tab 0     Allergies   Allergen Reactions    Aspirin Other (comments)     Abdominal pain     Family History   Problem Relation Age of Onset    Heart Disease Mother     No Known Problems Father     Diabetes Paternal Aunt      Social History     Tobacco Use    Smoking status: Never Smoker    Smokeless tobacco: Never Used Substance Use Topics    Alcohol use: No     Alcohol/week: 0.0 standard drinks     Diet, Lifestyle: The patient is prescribed and follows a special diet. Exercise level: moderately active    Depression Risk Screen     3 most recent PHQ Screens 8/5/2019   Little interest or pleasure in doing things Not at all   Feeling down, depressed, irritable, or hopeless Not at all   Total Score PHQ 2 0     Alcohol Risk Screen   You do not drink alcohol or very rarely. Functional Ability and Level of Safety   Hearing Loss  Hearing is good. Vision Screening  Vision is good. Visual Acuity Screening    Right eye Left eye Both eyes   Without correction: 20/50 20/25 20/25   With correction:            Activities of Daily Living  The home contains: no safety equipment. Patient does total self care    Fall Risk Screen  Fall Risk Assessment, last 12 mths 8/5/2019   Able to walk? Yes   Fall in past 12 months? No       Abuse Screen  Patient is not abused    Screening EKG   EKG order placed: No    Patient Care Team   Patient Care Team:  Patel Peterson MD as PCP - General (Internal Medicine)  Eufemia Nieves MD (Ophthalmology)     End of Life Planning   Advanced care planning directives were discussed with the patient and /or family/caregiver. Assessment/Plan   Education and counseling provided:  Are appropriate based on today's review and evaluation  End-of-Life planning (with patient's consent)    Diagnoses and all orders for this visit:    1. Welcome to Medicare preventive visit    2.  Type 2 diabetes mellitus without complication, without long-term current use of insulin (HCC)  -     AMB POC HEMOGLOBIN A1C  -     SITagliptin (JANUVIA) 100 mg tablet; take 1 tablet by mouth once daily for TYPE 2 DIABETES MELLITUS  -     glimepiride (AMARYL) 2 mg tablet; take 1 tablet by mouth every morning  -     metFORMIN ER (GLUCOPHAGE XR) 500 mg tablet; take 1 tablet by mouth twice a day for TYPE 2 DIABETES MELLITUS  -     CBC W/O DIFF; Future  -     METABOLIC PANEL, COMPREHENSIVE; Future  -     LIPID PANEL; Future    3. Pure hypercholesterolemia  -     atorvastatin (LIPITOR) 40 mg tablet; take 1 tablet by mouth once daily  -     METABOLIC PANEL, COMPREHENSIVE; Future  -     LIPID PANEL; Future    4. Essential hypertension  -     amLODIPine (NORVASC) 5 mg tablet; Take 1 Tab by mouth daily. Indications: high blood pressure  -     telmisartan-hydroCHLOROthiazide (MICARDIS HCT) 80-25 mg per tablet; Take 1 Tab by mouth daily. Indications: high blood pressure  -     CBC W/O DIFF; Future  -     METABOLIC PANEL, COMPREHENSIVE; Future  -     LIPID PANEL; Future    5.  Class 1 obesity due to excess calories with serious comorbidity and body mass index (BMI) of 30.0 to 30.9 in adult  -increase exercise      Health Maintenance Due   Topic Date Due    Influenza Age 5 to Adult  08/01/2019

## 2019-08-05 NOTE — PATIENT INSTRUCTIONS
Medicare Wellness Visit, Female The best way to live healthy is to have a lifestyle where you eat a well-balanced diet, exercise regularly, limit alcohol use, and quit all forms of tobacco/nicotine, if applicable. Regular preventive services are another way to keep healthy. Preventive services (vaccines, screening tests, monitoring & exams) can help personalize your care plan, which helps you manage your own care. Screening tests can find health problems at the earliest stages, when they are easiest to treat. [facility] follows the current, evidence-based guidelines published by the [facility] [place] ([facility]) when recommending preventive services for our patients. Because we follow these guidelines, sometimes recommendations change over time as research supports it. (For example, mammograms used to be recommended annually. Even though Medicare will still pay for an annual mammogram, the newer guidelines recommend a mammogram every two years for women of average risk.) Of course, you and your doctor may decide to screen more often for some diseases, based on your risk and your health status. Preventive services for you include: - Medicare offers their members a free annual wellness visit, which is time for you and your primary care provider to discuss and plan for your preventive service needs. Take advantage of this benefit every year! 
-All adults over the age of 72 should receive the recommended pneumonia vaccines. Current USPSTF guidelines recommend a series of two vaccines for the best pneumonia protection.  
-All adults should have a flu vaccine yearly and a tetanus vaccine every 10 years. All adults age 61 and older should receive a shingles vaccine once in their lifetime.   
-A bone mass density test is recommended when a woman turns 65 to screen for osteoporosis. This test is only recommended one time, as a screening. Some providers will use this same test as a disease monitoring tool if you already have osteoporosis. -All adults age 38-68 who are overweight should have a diabetes screening test once every three years.  
-Other screening tests and preventive services for persons with diabetes include: an eye exam to screen for diabetic retinopathy, a kidney function test, a foot exam, and stricter control over your cholesterol.  
-Cardiovascular screening for adults with routine risk involves an electrocardiogram (ECG) at intervals determined by your doctor.  
-Colorectal cancer screenings should be done for adults age 54-65 with no increased risk factors for colorectal cancer. There are a number of acceptable methods of screening for this type of cancer. Each test has its own benefits and drawbacks. Discuss with your doctor what is most appropriate for you during your annual wellness visit. The different tests include: colonoscopy (considered the best screening method), a fecal occult blood test, a fecal DNA test, and sigmoidoscopy. -Breast cancer screenings are recommended every other year for women of normal risk, age 54-69. 
-Cervical cancer screenings for women over age 72 are only recommended with certain risk factors.  
-All adults born between [facility] should be screened once for Hepatitis C. Here is a list of your current Health Maintenance items (your personalized list of preventive services) with a due date: 
Health Maintenance Due Topic Date Due  Shingles Vaccine (1 of 2) 05/27/2003  Flu Vaccine  08/01/2019 Weight loss: 
  
Watch portion sizes:  
1/2 your plate should be veggies for lunch and dinner. Carbohydrates should be no larger than the size of a tennis ball. Protein/meat should be the size of a deck of playing cards. Eat 3 meals/day Exercise - 150 minutes/week No beverages with calories Aim for losing 1lb/week Follow a 1200 calorie/day diet

## 2019-12-02 DIAGNOSIS — E11.9 TYPE 2 DIABETES MELLITUS WITHOUT COMPLICATION, WITHOUT LONG-TERM CURRENT USE OF INSULIN (HCC): ICD-10-CM

## 2020-02-03 ENCOUNTER — HOSPITAL ENCOUNTER (OUTPATIENT)
Dept: LAB | Age: 65
Discharge: HOME | End: 2020-02-03
Payer: MEDICARE

## 2020-02-03 ENCOUNTER — OFFICE VISIT (OUTPATIENT)
Dept: FAMILY MEDICINE CLINIC | Age: 65
End: 2020-02-03

## 2020-02-03 VITALS
HEIGHT: 62 IN | WEIGHT: 173.8 LBS | SYSTOLIC BLOOD PRESSURE: 116 MMHG | TEMPERATURE: 98.3 F | BODY MASS INDEX: 31.98 KG/M2 | DIASTOLIC BLOOD PRESSURE: 78 MMHG | OXYGEN SATURATION: 99 % | HEART RATE: 99 BPM | RESPIRATION RATE: 18 BRPM

## 2020-02-03 DIAGNOSIS — Z00.00 MEDICARE ANNUAL WELLNESS VISIT, SUBSEQUENT: ICD-10-CM

## 2020-02-03 DIAGNOSIS — Z12.31 ENCOUNTER FOR SCREENING MAMMOGRAM FOR BREAST CANCER: ICD-10-CM

## 2020-02-03 DIAGNOSIS — E11.9 TYPE 2 DIABETES MELLITUS WITHOUT COMPLICATION, WITHOUT LONG-TERM CURRENT USE OF INSULIN (HCC): Primary | ICD-10-CM

## 2020-02-03 DIAGNOSIS — E78.00 PURE HYPERCHOLESTEROLEMIA: ICD-10-CM

## 2020-02-03 DIAGNOSIS — M25.562 MEDIAL KNEE PAIN, LEFT: ICD-10-CM

## 2020-02-03 DIAGNOSIS — I10 ESSENTIAL HYPERTENSION: ICD-10-CM

## 2020-02-03 DIAGNOSIS — E11.9 TYPE 2 DIABETES MELLITUS WITHOUT COMPLICATION, WITHOUT LONG-TERM CURRENT USE OF INSULIN (HCC): ICD-10-CM

## 2020-02-03 PROBLEM — E11.21 TYPE 2 DIABETES WITH NEPHROPATHY (HCC): Status: ACTIVE | Noted: 2020-02-03

## 2020-02-03 PROBLEM — E11.3599 TYPE 2 DIABETES MELLITUS WITH PROLIFERATIVE RETINOPATHY (HCC): Status: ACTIVE | Noted: 2020-02-03

## 2020-02-03 LAB — HBA1C MFR BLD: 7 % (ref 4.2–5.6)

## 2020-02-03 PROCEDURE — 36415 COLL VENOUS BLD VENIPUNCTURE: CPT

## 2020-02-03 PROCEDURE — 83036 HEMOGLOBIN GLYCOSYLATED A1C: CPT

## 2020-02-03 RX ORDER — AMLODIPINE BESYLATE 5 MG/1
5 TABLET ORAL DAILY
Qty: 90 TAB | Refills: 1 | Status: SHIPPED | OUTPATIENT
Start: 2020-02-03 | End: 2020-08-27 | Stop reason: SDUPTHER

## 2020-02-03 RX ORDER — NAPROXEN 500 MG/1
500 TABLET ORAL
Qty: 60 TAB | Refills: 0 | Status: SHIPPED | OUTPATIENT
Start: 2020-02-03

## 2020-02-03 RX ORDER — TELMISARTAN AND HYDROCHLORTHIAZIDE 80; 25 MG/1; MG/1
1 TABLET ORAL DAILY
Qty: 90 TAB | Refills: 1 | Status: SHIPPED | OUTPATIENT
Start: 2020-02-03 | End: 2020-03-26 | Stop reason: SDUPTHER

## 2020-02-03 RX ORDER — ATORVASTATIN CALCIUM 40 MG/1
TABLET, FILM COATED ORAL
Qty: 90 TAB | Refills: 3 | Status: SHIPPED | OUTPATIENT
Start: 2020-02-03 | End: 2020-08-27 | Stop reason: SDUPTHER

## 2020-02-03 RX ORDER — METFORMIN HYDROCHLORIDE 500 MG/1
TABLET, EXTENDED RELEASE ORAL
Qty: 180 TAB | Refills: 1 | Status: SHIPPED | OUTPATIENT
Start: 2020-02-03 | End: 2020-08-27 | Stop reason: SDUPTHER

## 2020-02-03 NOTE — PATIENT INSTRUCTIONS
Medicare Wellness Visit, Female The best way to live healthy is to have a lifestyle where you eat a well-balanced diet, exercise regularly, limit alcohol use, and quit all forms of tobacco/nicotine, if applicable. Regular preventive services are another way to keep healthy. Preventive services (vaccines, screening tests, monitoring & exams) can help personalize your care plan, which helps you manage your own care. Screening tests can find health problems at the earliest stages, when they are easiest to treat. [facility] follows the current, evidence-based guidelines published by the [facility] [place] ([facility]) when recommending preventive services for our patients. Because we follow these guidelines, sometimes recommendations change over time as research supports it. (For example, mammograms used to be recommended annually. Even though Medicare will still pay for an annual mammogram, the newer guidelines recommend a mammogram every two years for women of average risk). Of course, you and your doctor may decide to screen more often for some diseases, based on your risk and your co-morbidities (chronic disease you are already diagnosed with). Preventive services for you include: - Medicare offers their members a free annual wellness visit, which is time for you and your primary care provider to discuss and plan for your preventive service needs. Take advantage of this benefit every year! 
-All adults over the age of 72 should receive the recommended pneumonia vaccines. Current USPSTF guidelines recommend a series of two vaccines for the best pneumonia protection.  
-All adults should have a flu vaccine yearly and a tetanus vaccine every 10 years.  
-All adults age 48 and older should receive the shingles vaccines (series of two vaccines). -All adults age 38-68 who are overweight should have a diabetes screening test once every three years. -All adults born between [birth year] and 1967 should be screened once for Hepatitis C. 
-Other screening tests and preventive services for persons with diabetes include: an eye exam to screen for diabetic retinopathy, a kidney function test, a foot exam, and stricter control over your cholesterol.  
-Cardiovascular screening for adults with routine risk involves an electrocardiogram (ECG) at intervals determined by your doctor.  
-Colorectal cancer screenings should be done for adults age 54-65 with no increased risk factors for colorectal cancer. There are a number of acceptable methods of screening for this type of cancer. Each test has its own benefits and drawbacks. Discuss with your doctor what is most appropriate for you during your annual wellness visit. The different tests include: colonoscopy (considered the best screening method), a fecal occult blood test, a fecal DNA test, and sigmoidoscopy. 
 
-A bone mass density test is recommended when a woman turns 65 to screen for osteoporosis. This test is only recommended one time, as a screening. Some providers will use this same test as a disease monitoring tool if you already have osteoporosis. -Breast cancer screenings are recommended every other year for women of normal risk, age 54-69. 
-Cervical cancer screenings for women over age 72 are only recommended with certain risk factors. Here is a list of your current Health Maintenance items (your personalized list of preventive services) with a due date: 
Health Maintenance Due Topic Date Due  
 Annual Well Visit  12/04/2019  Shingles Vaccine (2 of 2) 12/12/2019  Mammogram  01/17/2020  Hemoglobin A1C    02/05/2020

## 2020-02-03 NOTE — PROGRESS NOTES
Assessment/Plan:    1. Type 2 diabetes mellitus without complication, without long-term current use of insulin (HCC)  -cont same.  - HEMOGLOBIN A1C W/O EAG; Future  - SITagliptin (JANUVIA) 100 mg tablet; take 1 tablet by mouth once daily  Dispense: 90 Tab; Refill: 0  - metFORMIN ER (GLUCOPHAGE XR) 500 mg tablet; take 1 tablet by mouth twice a day for TYPE 2 DIABETES MELLITUS  Dispense: 180 Tab; Refill: 1    2. Medicare annual wellness visit, subsequent    3. Encounter for screening mammogram for breast cancer  -pt declines. But I ordered test in case she changes her mind  - PARI MAMMO BI SCREENING INCL CAD; Future    4. Pure hypercholesterolemia  - atorvastatin (LIPITOR) 40 mg tablet; take 1 tablet by mouth once daily  Dispense: 90 Tab; Refill: 3    5. Essential hypertension  - amLODIPine (NORVASC) 5 mg tablet; Take 1 Tab by mouth daily. Indications: high blood pressure  Dispense: 90 Tab; Refill: 1  - telmisartan-hydroCHLOROthiazide (MICARDIS HCT) 80-25 mg per tablet; Take 1 Tab by mouth daily. Indications: high blood pressure  Dispense: 90 Tab; Refill: 1    6. Medial knee pain, left  - naproxen (NAPROSYN) 500 mg tablet; Take 1 Tab by mouth two (2) times daily as needed for Pain. Dispense: 60 Tab; Refill: 0      The plan was discussed with the patient. The patient verbalized understanding and is in agreement with the plan. All medication potential side effects were discussed with the patient.     Health Maintenance:   Health Maintenance   Topic Date Due    BREAST CANCER SCRN MAMMOGRAM  01/17/2020    HEMOGLOBIN A1C Q6M  02/05/2020    Shingrix Vaccine Age 50> (2 of 2) 05/10/2020 (Originally 12/12/2019)    FOOT EXAM Q1  05/02/2020    MICROALBUMIN Q1  05/02/2020    LIPID PANEL Q1  08/05/2020    MEDICARE YEARLY EXAM  02/03/2021    GLAUCOMA SCREENING Q2Y  09/10/2021    EYE EXAM RETINAL OR DILATED  09/10/2021    Pneumococcal 65+ years (2 of 2 - PPSV23) 04/28/2022    COLONOSCOPY  05/22/2024    DTaP/Tdap/Td series (2 - Td) 09/22/2025    Hepatitis C Screening  Completed    Bone Densitometry (Dexa) Screening  Completed    Influenza Age 5 to Adult  Completed       Jacque Pedroza is a 75 y.o. female and presents with Annual Wellness Visit     Subjective:  DM2- due for labs. Not checking sugars. Compliant with meds. ROS:  Constitutional: No recent weight change. No weakness/fatigue. No f/c. Cardiovascular: No CP/palpitations. No MAGANA/orthopnea/PND. Respiratory: No cough/sputum, dyspnea, wheezing. Gastointestinal: No dysphagia, reflux. No n/v. No constipation/diarrhea. No melena/rectal bleeding. The problem list was updated as a part of today's visit. Patient Active Problem List   Diagnosis Code    Type 2 diabetes mellitus without complication (HCC) I18.6    Essential hypertension I10    H/O vitamin D deficiency Z86.39    Gastroesophageal reflux disease without esophagitis K21.9    Allergic rhinitis due to allergen J30.9    Macular degeneration, bilateral H35.30    Pure hypercholesterolemia E78.00    Hives L50.9    Breast nodule N63.0    Osteopenia of neck of right femur M85.851       The PSH, FH were reviewed. SH:  Social History     Tobacco Use    Smoking status: Never Smoker    Smokeless tobacco: Never Used   Substance Use Topics    Alcohol use: No     Alcohol/week: 0.0 standard drinks    Drug use: No       Medications/Allergies:  Current Outpatient Medications on File Prior to Visit   Medication Sig Dispense Refill    SITagliptin (JANUVIA) 100 mg tablet take 1 tablet by mouth once daily 90 Tab 0    glimepiride (AMARYL) 2 mg tablet take 1 tablet by mouth every morning 90 Tab 1    metFORMIN ER (GLUCOPHAGE XR) 500 mg tablet take 1 tablet by mouth twice a day for TYPE 2 DIABETES MELLITUS 180 Tab 1    atorvastatin (LIPITOR) 40 mg tablet take 1 tablet by mouth once daily 90 Tab 3    amLODIPine (NORVASC) 5 mg tablet Take 1 Tab by mouth daily.  Indications: high blood pressure 90 Tab 1  telmisartan-hydroCHLOROthiazide (MICARDIS HCT) 80-25 mg per tablet Take 1 Tab by mouth daily. Indications: high blood pressure 90 Tab 1    naproxen (NAPROSYN) 500 mg tablet Take 1 Tab by mouth two (2) times daily as needed for Pain. 60 Tab 0    vit A/vit C/vit E/zinc/copper (ICAPS AREDS PO) Take  by mouth.  glucose blood VI test strips (BLOOD GLUCOSE TEST) strip Test glucose daily 100 Strip 11    Lancets misc Test glucose daily 1 Each 11     No current facility-administered medications on file prior to visit. Allergies   Allergen Reactions    Aspirin Other (comments)     Abdominal pain       Objective:  Visit Vitals  /78 (BP 1 Location: Right arm, BP Patient Position: Sitting)   Pulse 99   Temp 98.3 °F (36.8 °C) (Oral)   Resp 18   Ht 5' 1.5\" (1.562 m)   Wt 173 lb 12.8 oz (78.8 kg)   SpO2 99%   BMI 32.31 kg/m²      Constitutional: Well developed, nourished, no distress, alert, obese habitus   CV: S1, S2.  RRR. No murmurs/rubs. No edema. Pulm: No abnormalities on inspection. Clear to auscultation bilaterally. No wheezing/rhonchi. Normal effort. GI: Soft, nontender, nondistended. Normal active bowel sounds. This is the Subsequent Medicare Annual Wellness Exam, performed 12 months or more after the Initial AWV or the last Subsequent AWV    I have reviewed the patient's medical history in detail and updated the computerized patient record.      History     Patient Active Problem List   Diagnosis Code    Type 2 diabetes mellitus without complication (HCC) E13.8    Essential hypertension I10    H/O vitamin D deficiency Z86.39    Gastroesophageal reflux disease without esophagitis K21.9    Allergic rhinitis due to allergen J30.9    Macular degeneration, bilateral H35.30    Pure hypercholesterolemia E78.00    Hives L50.9    Breast nodule N63.0    Osteopenia of neck of right femur M85.851     Past Medical History:   Diagnosis Date    Diabetes ([address]     Hypertension Past Surgical History:   Procedure Laterality Date    HX HEENT Right 2014    cataract removal    HX ORTHOPAEDIC  1977     Lumbar Fussion     Current Outpatient Medications   Medication Sig Dispense Refill    SITagliptin (JANUVIA) 100 mg tablet take 1 tablet by mouth once daily 90 Tab 0    glimepiride (AMARYL) 2 mg tablet take 1 tablet by mouth every morning 90 Tab 1    metFORMIN ER (GLUCOPHAGE XR) 500 mg tablet take 1 tablet by mouth twice a day for TYPE 2 DIABETES MELLITUS 180 Tab 1    atorvastatin (LIPITOR) 40 mg tablet take 1 tablet by mouth once daily 90 Tab 3    amLODIPine (NORVASC) 5 mg tablet Take 1 Tab by mouth daily. Indications: high blood pressure 90 Tab 1    telmisartan-hydroCHLOROthiazide (MICARDIS HCT) 80-25 mg per tablet Take 1 Tab by mouth daily. Indications: high blood pressure 90 Tab 1    naproxen (NAPROSYN) 500 mg tablet Take 1 Tab by mouth two (2) times daily as needed for Pain. 60 Tab 0    vit A/vit C/vit E/zinc/copper (ICAPS AREDS PO) Take  by mouth.       glucose blood VI test strips (BLOOD GLUCOSE TEST) strip Test glucose daily 100 Strip 11    Lancets misc Test glucose daily 1 Each 11     Allergies   Allergen Reactions    Aspirin Other (comments)     Abdominal pain       Family History   Problem Relation Age of Onset    Heart Disease Mother     No Known Problems Father     Diabetes Paternal Aunt      Social History     Tobacco Use    Smoking status: Never Smoker    Smokeless tobacco: Never Used   Substance Use Topics    Alcohol use: No     Alcohol/week: 0.0 standard drinks       Depression Risk Factor Screening:     3 most recent PHQ Screens 2/3/2020   Little interest or pleasure in doing things Not at all   Feeling down, depressed, irritable, or hopeless Not at all   Total Score PHQ 2 0       Alcohol Risk Factor Screening:   Do you average 1 drink per night or more than 7 drinks a week:  No    On any one occasion in the past three months have you have had more than 3 drinks containing alcohol:  No      Functional Ability and Level of Safety:   Hearing: Hearing is good. Activities of Daily Living: The home contains: no safety equipment. Patient does total self care    Ambulation: with no difficulty    Fall Risk:  Fall Risk Assessment, last 12 mths 2/3/2020   Able to walk? Yes   Fall in past 12 months? No       Abuse Screen:  Patient is not abused    Cognitive Screening   Has your family/caregiver stated any concerns about your memory: no  Cognitive Screening: . Patient Care Team   Patient Care Team:  Champ Antonio MD as PCP - General (Internal Medicine)  Champ Antonio MD as PCP - [facility]  Richelle Severs, MD (Ophthalmology)    Assessment/Plan   Education and counseling provided:  Are appropriate based on today's review and evaluation    Diagnoses and all orders for this visit:    1. Type 2 diabetes mellitus without complication, without long-term current use of insulin (HCC)  -     HEMOGLOBIN A1C W/O EAG; Future    2. Medicare annual wellness visit, subsequent    3. Encounter for screening mammogram for breast cancer  -     PARI MAMMO BI SCREENING INCL CAD;  Future        Health Maintenance Due   Topic Date Due    BREAST CANCER SCRN MAMMOGRAM  01/17/2020    HEMOGLOBIN A1C Q6M  02/05/2020

## 2020-02-03 NOTE — PROGRESS NOTES
2019 eye exam Dr Alicja Cruz   No ED   Matt Haywood is a 75 y.o. female (: 1955) presenting to address:    Chief Complaint   Patient presents with    Annual Wellness Visit       Vitals:    20 0816 20 0823   BP: 116/78    Pulse: (!) 104 99   Resp: 18    Temp: 98.3 °F (36.8 °C)    TempSrc: Oral    SpO2: 99% 99%   Weight: 173 lb 12.8 oz (78.8 kg)    Height: 5' 1.5\" (1.562 m)    PainSc:   0 - No pain        Hearing/Vision:   No exam data present    Learning Assessment:     Learning Assessment 2016   PRIMARY LEARNER Patient   HIGHEST LEVEL OF EDUCATION - PRIMARY LEARNER  4 YEARS OF COLLEGE   BARRIERS PRIMARY LEARNER NONE   CO-LEARNER CAREGIVER No   PRIMARY LANGUAGE ENGLISH   LEARNER PREFERENCE PRIMARY LISTENING   ANSWERED BY self   RELATIONSHIP SELF     Depression Screening:     3 most recent PHQ Screens 2/3/2020   Little interest or pleasure in doing things Not at all   Feeling down, depressed, irritable, or hopeless Not at all   Total Score PHQ 2 0     Fall Risk Assessment:     Fall Risk Assessment, last 12 mths 2/3/2020   Able to walk? Yes   Fall in past 12 months? No     Abuse Screening:     Abuse Screening Questionnaire 2/3/2020   Do you ever feel afraid of your partner? N   Are you in a relationship with someone who physically or mentally threatens you? N   Is it safe for you to go home? Y     Coordination of Care Questionaire:   1. Have you been to the ER, urgent care clinic since your last visit? Hospitalized since your last visit? No     2. Have you seen or consulted any other health care providers outside of the [address] [place] [address] since your last visit? Include any pap smears or colon screening. Yes     Advanced Directive:   1. Do you have an Advanced Directive? No     2.  Would you like information on Advanced Directives  No       Health Maintenance Due   Topic Date Due    BREAST CANCER SCRN MAMMOGRAM  2020    HEMOGLOBIN A1C Q6M  2020

## 2020-03-26 DIAGNOSIS — E11.9 TYPE 2 DIABETES MELLITUS WITHOUT COMPLICATION, WITHOUT LONG-TERM CURRENT USE OF INSULIN (HCC): ICD-10-CM

## 2020-03-26 DIAGNOSIS — I10 ESSENTIAL HYPERTENSION: ICD-10-CM

## 2020-03-26 RX ORDER — METFORMIN HYDROCHLORIDE 500 MG/1
TABLET, EXTENDED RELEASE ORAL
Qty: 180 TAB | Refills: 1 | Status: CANCELLED | OUTPATIENT
Start: 2020-03-26

## 2020-03-26 NOTE — TELEPHONE ENCOUNTER
Patient last saw you on 02/02/2020. Last refill was on 02/03/2020. Josias Edmond  Was only a 30 day supply and she would like a 2 month supply so she doesn't have to keep going back to the pharmacy.     Future Appointments   Date Time Provider James Branch   8/3/2020  8:30 AM Marin Shaw MD [facility] [place]

## 2020-03-26 NOTE — TELEPHONE ENCOUNTER
Requested Prescriptions     Pending Prescriptions Disp Refills    telmisartan-hydroCHLOROthiazide (Micardis HCT) 80-25 mg per tablet 90 Tab 1     Sig: Take 1 Tab by mouth daily. Indications: high blood pressure    SITagliptin (Januvia) 100 mg tablet 90 Tab 0     Sig: take 1 tablet by mouth once daily    metFORMIN ER (GLUCOPHAGE XR) 500 mg tablet 180 Tab 1     Sig: take 1 tablet by mouth twice a day for TYPE 2 DIABETES MELLITUS     Pt called to request a refill. She is wanting to know if she can get a 2 month supply so she does not have to go into the pharmacy as much.      Future Appointments   Date Time Provider James Branch   8/3/2020  8:30 AM Carolee Rivera  [facility]

## 2020-03-27 RX ORDER — TELMISARTAN AND HYDROCHLORTHIAZIDE 80; 25 MG/1; MG/1
1 TABLET ORAL DAILY
Qty: 90 TAB | Refills: 1 | Status: SHIPPED | OUTPATIENT
Start: 2020-03-27 | End: 2020-08-27 | Stop reason: SDUPTHER

## 2020-04-28 DIAGNOSIS — E11.9 TYPE 2 DIABETES MELLITUS WITHOUT COMPLICATION, WITHOUT LONG-TERM CURRENT USE OF INSULIN (HCC): ICD-10-CM

## 2020-04-28 RX ORDER — GLIMEPIRIDE 2 MG/1
TABLET ORAL
Qty: 90 TAB | Refills: 1 | Status: SHIPPED | OUTPATIENT
Start: 2020-04-28 | End: 2020-08-27 | Stop reason: SDUPTHER

## 2020-06-28 DIAGNOSIS — E11.9 TYPE 2 DIABETES MELLITUS WITHOUT COMPLICATION, WITHOUT LONG-TERM CURRENT USE OF INSULIN (HCC): ICD-10-CM

## 2020-08-27 ENCOUNTER — VIRTUAL VISIT (OUTPATIENT)
Dept: FAMILY MEDICINE CLINIC | Age: 65
End: 2020-08-27

## 2020-08-27 DIAGNOSIS — E11.3593 TYPE 2 DIABETES MELLITUS WITH PROLIFERATIVE RETINOPATHY OF BOTH EYES, WITHOUT LONG-TERM CURRENT USE OF INSULIN, MACULAR EDEMA PRESENCE UNSPECIFIED, UNSPECIFIED PROLIFERATIVE RETINOPATHY TYPE (HCC): ICD-10-CM

## 2020-08-27 DIAGNOSIS — E78.00 PURE HYPERCHOLESTEROLEMIA: ICD-10-CM

## 2020-08-27 DIAGNOSIS — I10 ESSENTIAL HYPERTENSION: Primary | ICD-10-CM

## 2020-08-27 PROBLEM — E11.21 TYPE 2 DIABETES WITH NEPHROPATHY (HCC): Status: RESOLVED | Noted: 2020-02-03 | Resolved: 2020-08-27

## 2020-08-27 PROBLEM — L50.9 HIVES: Status: RESOLVED | Noted: 2017-12-04 | Resolved: 2020-08-27

## 2020-08-27 RX ORDER — ATORVASTATIN CALCIUM 40 MG/1
TABLET, FILM COATED ORAL
Qty: 90 TAB | Refills: 0 | Status: SHIPPED | OUTPATIENT
Start: 2020-08-27

## 2020-08-27 RX ORDER — AMLODIPINE BESYLATE 5 MG/1
5 TABLET ORAL DAILY
Qty: 90 TAB | Refills: 0 | Status: SHIPPED | OUTPATIENT
Start: 2020-08-27 | End: 2020-10-26

## 2020-08-27 RX ORDER — GLIMEPIRIDE 2 MG/1
TABLET ORAL
Qty: 90 TAB | Refills: 0 | Status: SHIPPED | OUTPATIENT
Start: 2020-08-27 | End: 2021-01-19 | Stop reason: SDUPTHER

## 2020-08-27 RX ORDER — METFORMIN HYDROCHLORIDE 500 MG/1
TABLET, EXTENDED RELEASE ORAL
Qty: 180 TAB | Refills: 0 | Status: SHIPPED | OUTPATIENT
Start: 2020-08-27 | End: 2021-01-26 | Stop reason: SDUPTHER

## 2020-08-27 RX ORDER — TELMISARTAN AND HYDROCHLORTHIAZIDE 80; 25 MG/1; MG/1
1 TABLET ORAL DAILY
Qty: 90 TAB | Refills: 0 | Status: SHIPPED | OUTPATIENT
Start: 2020-08-27

## 2020-08-27 NOTE — PROGRESS NOTES
Rajni Callahan is a 77 y.o. female who was seen by synchronous (real-time) audio-video technology on 8/27/2020 for Diabetes      Assessment & Plan:   Diagnoses and all orders for this visit:    1. Essential hypertension- cont current. Get labs  -     CBC W/O DIFF; Future  -     METABOLIC PANEL, COMPREHENSIVE; Future  -     LIPID PANEL; Future  -     telmisartan-hydroCHLOROthiazide (Micardis HCT) 80-25 mg per tablet; Take 1 Tab by mouth daily. Indications: high blood pressure  -     amLODIPine (NORVASC) 5 mg tablet; Take 1 Tab by mouth daily. Indications: high blood pressure    2. Type 2 diabetes mellitus with proliferative retinopathy of both eyes, without long-term current use of insulin, macular edema presence unspecified, unspecified proliferative retinopathy type ([address]- cont current. If a1c good, will do f/u in 6mo  -     HEMOGLOBIN A1C W/O EAG; Future  -     MICROALBUMIN, UR, RAND W/ MICROALB/CREAT RATIO; Future  -     SITagliptin (Januvia) 100 mg tablet; take 1 tablet by mouth once daily  -     glimepiride (AMARYL) 2 mg tablet; take 1 tablet by mouth every morning  -     metFORMIN ER (GLUCOPHAGE XR) 500 mg tablet; take 1 tablet by mouth twice a day for TYPE 2 DIABETES MELLITUS    3. Pure hypercholesterolemia  -     METABOLIC PANEL, COMPREHENSIVE; Future  -     LIPID PANEL; Future  -     atorvastatin (LIPITOR) 40 mg tablet; take 1 tablet by mouth once daily        Subjective:     htn -  Not checking bp at home. No complaints and feels well.    hld- on statin. No myalgias. dm2- due for labs. bs running 110s. No lows. Prior to Admission medications    Medication Sig Start Date End Date Taking?  Authorizing Provider   SITagliptin (Januvia) 100 mg tablet take 1 tablet by mouth once daily 6/29/20   Axel Mejia MD   glimepiride (AMARYL) 2 mg tablet take 1 tablet by mouth every morning 4/28/20   Axel Mejia MD   telmisartan-hydroCHLOROthiazide (Micardis HCT) 80-25 mg per tablet Take 1 Tab by mouth daily. Indications: high blood pressure 3/27/20   Karlos Mass Rashid Pham MD   atorvastatin (LIPITOR) 40 mg tablet take 1 tablet by mouth once daily 2/3/20   Silvio Morrison MD   amLODIPine (NORVASC) 5 mg tablet Take 1 Tab by mouth daily. Indications: high blood pressure 2/3/20   Silvio Morrison MD   metFORMIN ER (GLUCOPHAGE XR) 500 mg tablet take 1 tablet by mouth twice a day for TYPE 2 DIABETES MELLITUS 2/3/20   Silvio Morrison MD   naproxen (NAPROSYN) 500 mg tablet Take 1 Tab by mouth two (2) times daily as needed for Pain. 2/3/20   Rashid Sharma MD   vit A/vit C/vit E/zinc/copper (ICAPS AREDS PO) Take  by mouth. Provider, Historical     Patient Active Problem List   Diagnosis Code    Essential hypertension I10    H/O vitamin D deficiency Z86.39    Gastroesophageal reflux disease without esophagitis K21.9    Allergic rhinitis due to allergen J30.9    Macular degeneration, bilateral H35.30    Pure hypercholesterolemia E78.00    Breast nodule N63.0    Osteopenia of neck of right femur M85.851    Type 2 diabetes mellitus with proliferative retinopathy ([facility] [address] O45.3219       Review of Systems   Constitutional: Negative. Respiratory: Negative. Cardiovascular: Negative. Objective:   No flowsheet data found.      [INSTRUCTIONS:  \"[x]\" Indicates a positive item  \"[]\" Indicates a negative item  -- DELETE ALL ITEMS NOT EXAMINED]    Constitutional: [x] Appears well-developed and well-nourished [x] No apparent distress      [] Abnormal -     Mental status: [x] Alert and awake  [x] Oriented to person/place/time [x] Able to follow commands    [] Abnormal -     Eyes:   EOM    [x]  Normal    [] Abnormal -   Sclera  [x]  Normal    [] Abnormal -          Discharge [x]  None visible   [] Abnormal -     HENT: [x] Normocephalic, atraumatic  [] Abnormal -   [x] Mouth/Throat: Mucous membranes are moist    External Ears [x] Normal  [] Abnormal -    Neck: [x] No visualized mass [] Abnormal -     Pulmonary/Chest: [] Respiratory effort normal   [] No visualized signs of difficulty breathing or respiratory distress        [] Abnormal -      Musculoskeletal:   [] Normal gait with no signs of ataxia         [] Normal range of motion of neck        [] Abnormal -     Neurological:        [] No Facial Asymmetry (Cranial nerve 7 motor function) (limited exam due to video visit)          [] No gaze palsy        [] Abnormal -          Skin:        [] No significant exanthematous lesions or discoloration noted on facial skin         [] Abnormal -            Psychiatric:       [x] Normal Affect [] Abnormal -        [x] No Hallucinations    Other pertinent observable physical exam findings:-        We discussed the expected course, resolution and complications of the diagnosis(es) in detail. Medication risks, benefits, costs, interactions, and alternatives were discussed as indicated. I advised her to contact the office if her condition worsens, changes or fails to improve as anticipated. She expressed understanding with the diagnosis(es) and plan. Juan Mo, who was evaluated through a patient-initiated, synchronous (real-time) audio-video encounter, and/or her healthcare decision maker, is aware that it is a billable service, with coverage as determined by her insurance carrier. She provided verbal consent to proceed: Yes, and patient identification was verified. It was conducted pursuant to the emergency declaration under the [address] [place] [address] and the [employer]. A caregiver was present when appropriate. Ability to conduct physical exam was limited. I was at home. The patient was at home.       Jl Slade MD

## 2020-09-16 ENCOUNTER — HOSPITAL ENCOUNTER (OUTPATIENT)
Dept: LAB | Age: 65
Discharge: HOME | End: 2020-09-16
Payer: MEDICARE

## 2020-09-16 DIAGNOSIS — I10 ESSENTIAL HYPERTENSION: ICD-10-CM

## 2020-09-16 DIAGNOSIS — E11.3593 TYPE 2 DIABETES MELLITUS WITH PROLIFERATIVE RETINOPATHY OF BOTH EYES, WITHOUT LONG-TERM CURRENT USE OF INSULIN, MACULAR EDEMA PRESENCE UNSPECIFIED, UNSPECIFIED PROLIFERATIVE RETINOPATHY TYPE (HCC): ICD-10-CM

## 2020-09-16 DIAGNOSIS — E78.00 PURE HYPERCHOLESTEROLEMIA: ICD-10-CM

## 2020-09-16 LAB
ALBUMIN SERPL-MCNC: 4.3 G/DL (ref 3.4–5)
ALBUMIN/GLOB SERPL: 1.2 {RATIO} (ref 0.8–1.7)
ALP SERPL-CCNC: 98 U/L (ref 45–117)
ALT SERPL-CCNC: 35 U/L (ref 13–56)
ANION GAP SERPL CALC-SCNC: 5 MMOL/L (ref 3–18)
AST SERPL-CCNC: 29 U/L (ref 10–38)
BILIRUB SERPL-MCNC: 1 MG/DL (ref 0.2–1)
BUN SERPL-MCNC: 23 MG/DL (ref 7–18)
BUN/CREAT SERPL: 23 (ref 12–20)
CALCIUM SERPL-MCNC: 9.4 MG/DL (ref 8.5–10.1)
CHLORIDE SERPL-SCNC: 104 MMOL/L (ref 100–111)
CHOLEST SERPL-MCNC: 141 MG/DL
CO2 SERPL-SCNC: 30 MMOL/L (ref 21–32)
CREAT SERPL-MCNC: 1.02 MG/DL (ref 0.6–1.3)
CREAT UR-MCNC: 448 MG/DL (ref 30–125)
ERYTHROCYTE [DISTWIDTH] IN BLOOD BY AUTOMATED COUNT: 13.9 % (ref 11.6–14.5)
GLOBULIN SER CALC-MCNC: 3.6 G/DL (ref 2–4)
GLUCOSE SERPL-MCNC: 129 MG/DL (ref 74–99)
HBA1C MFR BLD: 6.8 % (ref 4.2–5.6)
HCT VFR BLD AUTO: 41.6 % (ref 35–45)
HDLC SERPL-MCNC: 44 MG/DL (ref 40–60)
HDLC SERPL: 3.2 {RATIO} (ref 0–5)
HGB BLD-MCNC: 13.2 G/DL (ref 12–16)
LDLC SERPL CALC-MCNC: 72.6 MG/DL (ref 0–100)
LIPID PROFILE,FLP: NORMAL
MCH RBC QN AUTO: 27.8 PG (ref 24–34)
MCHC RBC AUTO-ENTMCNC: 31.7 G/DL (ref 31–37)
MCV RBC AUTO: 87.8 FL (ref 74–97)
MICROALBUMIN UR-MCNC: 3.93 MG/DL (ref 0–3)
MICROALBUMIN/CREAT UR-RTO: 9 MG/G (ref 0–30)
PLATELET # BLD AUTO: 331 K/UL (ref 135–420)
PMV BLD AUTO: 11.8 FL (ref 9.2–11.8)
POTASSIUM SERPL-SCNC: 4.6 MMOL/L (ref 3.5–5.5)
PROT SERPL-MCNC: 7.9 G/DL (ref 6.4–8.2)
RBC # BLD AUTO: 4.74 M/UL (ref 4.2–5.3)
SODIUM SERPL-SCNC: 139 MMOL/L (ref 136–145)
TRIGL SERPL-MCNC: 122 MG/DL (ref ?–150)
VLDLC SERPL CALC-MCNC: 24.4 MG/DL
WBC # BLD AUTO: 10.7 K/UL (ref 4.6–13.2)

## 2020-09-16 PROCEDURE — 80053 COMPREHEN METABOLIC PANEL: CPT

## 2020-09-16 PROCEDURE — 36415 COLL VENOUS BLD VENIPUNCTURE: CPT

## 2020-09-16 PROCEDURE — 85027 COMPLETE CBC AUTOMATED: CPT

## 2020-09-16 PROCEDURE — 83036 HEMOGLOBIN GLYCOSYLATED A1C: CPT

## 2020-09-16 PROCEDURE — 80061 LIPID PANEL: CPT

## 2020-09-16 PROCEDURE — 82043 UR ALBUMIN QUANTITATIVE: CPT

## 2020-10-24 DIAGNOSIS — I10 ESSENTIAL HYPERTENSION: ICD-10-CM

## 2020-10-26 RX ORDER — AMLODIPINE BESYLATE 5 MG/1
TABLET ORAL
Qty: 90 TAB | Refills: 0 | Status: SHIPPED | OUTPATIENT
Start: 2020-10-26

## 2020-12-03 DIAGNOSIS — E11.3593 TYPE 2 DIABETES MELLITUS WITH PROLIFERATIVE RETINOPATHY OF BOTH EYES, WITHOUT LONG-TERM CURRENT USE OF INSULIN, MACULAR EDEMA PRESENCE UNSPECIFIED, UNSPECIFIED PROLIFERATIVE RETINOPATHY TYPE (HCC): ICD-10-CM

## 2021-01-19 DIAGNOSIS — E11.3593 TYPE 2 DIABETES MELLITUS WITH PROLIFERATIVE RETINOPATHY OF BOTH EYES, WITHOUT LONG-TERM CURRENT USE OF INSULIN, MACULAR EDEMA PRESENCE UNSPECIFIED, UNSPECIFIED PROLIFERATIVE RETINOPATHY TYPE (HCC): ICD-10-CM

## 2021-01-19 RX ORDER — GLIMEPIRIDE 2 MG/1
TABLET ORAL
Qty: 90 TAB | Refills: 0 | Status: SHIPPED | OUTPATIENT
Start: 2021-01-19

## 2021-01-19 NOTE — TELEPHONE ENCOUNTER
Last refilled 8/27/20 for 90 with 0 refills .  Last OV same day   Future Appointments   Date Time Provider James Sarina   3/2/2021  9:00 AM Harley Tineo MD [facility]

## 2021-01-26 DIAGNOSIS — E11.3593 TYPE 2 DIABETES MELLITUS WITH PROLIFERATIVE RETINOPATHY OF BOTH EYES, WITHOUT LONG-TERM CURRENT USE OF INSULIN, MACULAR EDEMA PRESENCE UNSPECIFIED, UNSPECIFIED PROLIFERATIVE RETINOPATHY TYPE (HCC): ICD-10-CM

## 2021-01-26 RX ORDER — METFORMIN HYDROCHLORIDE 500 MG/1
TABLET, EXTENDED RELEASE ORAL
Qty: 180 TAB | Refills: 0 | Status: SHIPPED | OUTPATIENT
Start: 2021-01-26

## 2021-01-26 NOTE — TELEPHONE ENCOUNTER
Last written 8/27/20 according to pharmacy patient last filled 10/27/20 for 180 .  Last OV 8/27/20   Future Appointments   Date Time Provider James Branch   3/2/2021  9:00 AM Brian Tineo MD [facility]

## 2021-03-01 DIAGNOSIS — E11.3593 TYPE 2 DIABETES MELLITUS WITH PROLIFERATIVE RETINOPATHY OF BOTH EYES, WITHOUT LONG-TERM CURRENT USE OF INSULIN, MACULAR EDEMA PRESENCE UNSPECIFIED, UNSPECIFIED PROLIFERATIVE RETINOPATHY TYPE (HCC): ICD-10-CM

## 2021-03-30 ENCOUNTER — DOCUMENTATION ONLY (OUTPATIENT)
Dept: FAMILY MEDICINE CLINIC | Age: 66
End: 2021-03-30

## 2021-03-30 NOTE — PROGRESS NOTES
I called patient because we received a refill request, to see if she was establishing here or following Dr Taiwo Crespo. Patient said  she is following Dr Taiwo Crespo .

## 2021-04-22 DIAGNOSIS — E11.3593 TYPE 2 DIABETES MELLITUS WITH PROLIFERATIVE RETINOPATHY OF BOTH EYES, WITHOUT LONG-TERM CURRENT USE OF INSULIN, MACULAR EDEMA PRESENCE UNSPECIFIED, UNSPECIFIED PROLIFERATIVE RETINOPATHY TYPE (HCC): ICD-10-CM

## 2021-04-22 RX ORDER — METFORMIN HYDROCHLORIDE 500 MG/1
TABLET, EXTENDED RELEASE ORAL
Qty: 180 TAB | Refills: 0 | OUTPATIENT
Start: 2021-04-22

## 2021-04-23 ENCOUNTER — TELEPHONE (OUTPATIENT)
Dept: FAMILY MEDICINE CLINIC | Age: 66
End: 2021-04-23

## 2021-04-23 NOTE — TELEPHONE ENCOUNTER
Tried calling pt and could not leave a voicemail. Trying to see if pt needs to establish care with new provider or has followed Dr. Amador Webster.

## 2021-04-26 DIAGNOSIS — E11.3593 TYPE 2 DIABETES MELLITUS WITH PROLIFERATIVE RETINOPATHY OF BOTH EYES, WITHOUT LONG-TERM CURRENT USE OF INSULIN, MACULAR EDEMA PRESENCE UNSPECIFIED, UNSPECIFIED PROLIFERATIVE RETINOPATHY TYPE (HCC): ICD-10-CM

## 2021-04-26 RX ORDER — METFORMIN HYDROCHLORIDE 500 MG/1
TABLET, EXTENDED RELEASE ORAL
Qty: 180 TAB | Refills: 0 | OUTPATIENT
Start: 2021-04-26

## 2021-05-28 DIAGNOSIS — E11.3593 TYPE 2 DIABETES MELLITUS WITH PROLIFERATIVE RETINOPATHY OF BOTH EYES, WITHOUT LONG-TERM CURRENT USE OF INSULIN, MACULAR EDEMA PRESENCE UNSPECIFIED, UNSPECIFIED PROLIFERATIVE RETINOPATHY TYPE (HCC): ICD-10-CM

## 2021-06-01 NOTE — TELEPHONE ENCOUNTER
Courtesy refill already provided. Needs to establish care with one of the providers in this office for additional refills.

## 2023-09-20 NOTE — PROGRESS NOTES
Tell pt her a1c is down from 8.4-> 7.5. I'm recommending we start low dose amaryl, in addition to current regimen. Alternately, she can continue to work on diet/wt loss and we will em at next visit. 1